# Patient Record
Sex: MALE | Race: BLACK OR AFRICAN AMERICAN | NOT HISPANIC OR LATINO | Employment: FULL TIME
[De-identification: names, ages, dates, MRNs, and addresses within clinical notes are randomized per-mention and may not be internally consistent; named-entity substitution may affect disease eponyms.]

---

## 2017-02-27 ENCOUNTER — RECORDS - HEALTHEAST (OUTPATIENT)
Dept: ADMINISTRATIVE | Facility: OTHER | Age: 47
End: 2017-02-27

## 2017-04-11 ENCOUNTER — RECORDS - HEALTHEAST (OUTPATIENT)
Dept: ADMINISTRATIVE | Facility: OTHER | Age: 47
End: 2017-04-11

## 2017-08-10 ENCOUNTER — OFFICE VISIT - HEALTHEAST (OUTPATIENT)
Dept: FAMILY MEDICINE | Facility: CLINIC | Age: 47
End: 2017-08-10

## 2017-08-10 DIAGNOSIS — R36.9 DISCHARGE FROM PENIS: ICD-10-CM

## 2017-08-10 DIAGNOSIS — R21 RASH OF GROIN: ICD-10-CM

## 2017-08-11 ENCOUNTER — COMMUNICATION - HEALTHEAST (OUTPATIENT)
Dept: FAMILY MEDICINE | Facility: CLINIC | Age: 47
End: 2017-08-11

## 2018-01-30 ENCOUNTER — COMMUNICATION - HEALTHEAST (OUTPATIENT)
Dept: SCHEDULING | Facility: CLINIC | Age: 48
End: 2018-01-30

## 2018-01-31 ENCOUNTER — OFFICE VISIT - HEALTHEAST (OUTPATIENT)
Dept: FAMILY MEDICINE | Facility: CLINIC | Age: 48
End: 2018-01-31

## 2018-01-31 DIAGNOSIS — J10.1 INFLUENZA A: ICD-10-CM

## 2018-01-31 LAB
FLUAV AG SPEC QL IA: ABNORMAL
FLUBV AG SPEC QL IA: ABNORMAL

## 2018-04-09 ENCOUNTER — OFFICE VISIT - HEALTHEAST (OUTPATIENT)
Dept: FAMILY MEDICINE | Facility: CLINIC | Age: 48
End: 2018-04-09

## 2018-04-09 DIAGNOSIS — R05.9 COUGH: ICD-10-CM

## 2018-06-14 ENCOUNTER — COMMUNICATION - HEALTHEAST (OUTPATIENT)
Dept: SCHEDULING | Facility: CLINIC | Age: 48
End: 2018-06-14

## 2018-06-15 ENCOUNTER — OFFICE VISIT - HEALTHEAST (OUTPATIENT)
Dept: FAMILY MEDICINE | Facility: CLINIC | Age: 48
End: 2018-06-15

## 2018-06-15 ENCOUNTER — COMMUNICATION - HEALTHEAST (OUTPATIENT)
Dept: FAMILY MEDICINE | Facility: CLINIC | Age: 48
End: 2018-06-15

## 2018-06-15 DIAGNOSIS — R07.89 ATYPICAL CHEST PAIN: ICD-10-CM

## 2018-06-15 LAB
ALBUMIN SERPL-MCNC: 4.2 G/DL (ref 3.5–5)
ALP SERPL-CCNC: 55 U/L (ref 45–120)
ALT SERPL W P-5'-P-CCNC: 19 U/L (ref 0–45)
ANION GAP SERPL CALCULATED.3IONS-SCNC: 8 MMOL/L (ref 5–18)
AST SERPL W P-5'-P-CCNC: 18 U/L (ref 0–40)
BILIRUB SERPL-MCNC: 0.9 MG/DL (ref 0–1)
BUN SERPL-MCNC: 10 MG/DL (ref 8–22)
CALCIUM SERPL-MCNC: 9.6 MG/DL (ref 8.5–10.5)
CHLORIDE BLD-SCNC: 109 MMOL/L (ref 98–107)
CHOLEST SERPL-MCNC: 168 MG/DL
CO2 SERPL-SCNC: 26 MMOL/L (ref 22–31)
CREAT SERPL-MCNC: 0.95 MG/DL (ref 0.7–1.3)
FASTING STATUS PATIENT QL REPORTED: YES
GFR SERPL CREATININE-BSD FRML MDRD: >60 ML/MIN/1.73M2
GLUCOSE BLD-MCNC: 104 MG/DL (ref 70–125)
HDLC SERPL-MCNC: 39 MG/DL
LDLC SERPL CALC-MCNC: 113 MG/DL
POTASSIUM BLD-SCNC: 4.3 MMOL/L (ref 3.5–5)
PROT SERPL-MCNC: 7 G/DL (ref 6–8)
SODIUM SERPL-SCNC: 143 MMOL/L (ref 136–145)
TRIGL SERPL-MCNC: 82 MG/DL
TROPONIN I SERPL-MCNC: <0.01 NG/ML (ref 0–0.29)
TSH SERPL DL<=0.005 MIU/L-ACNC: 1.04 UIU/ML (ref 0.3–5)

## 2018-06-15 ASSESSMENT — MIFFLIN-ST. JEOR: SCORE: 1859.83

## 2018-06-18 ENCOUNTER — COMMUNICATION - HEALTHEAST (OUTPATIENT)
Dept: FAMILY MEDICINE | Facility: CLINIC | Age: 48
End: 2018-06-18

## 2018-10-08 ENCOUNTER — AMBULATORY - HEALTHEAST (OUTPATIENT)
Dept: FAMILY MEDICINE | Facility: CLINIC | Age: 48
End: 2018-10-08

## 2018-10-08 ENCOUNTER — COMMUNICATION - HEALTHEAST (OUTPATIENT)
Dept: FAMILY MEDICINE | Facility: CLINIC | Age: 48
End: 2018-10-08

## 2018-10-08 RX ORDER — AMMONIUM LACTATE 12 G/100G
LOTION TOPICAL
Refills: 8 | Status: SHIPPED | COMMUNITY
Start: 2018-09-21 | End: 2024-02-14

## 2018-10-10 ENCOUNTER — OFFICE VISIT - HEALTHEAST (OUTPATIENT)
Dept: FAMILY MEDICINE | Facility: CLINIC | Age: 48
End: 2018-10-10

## 2018-10-10 DIAGNOSIS — R22.40: ICD-10-CM

## 2018-10-10 DIAGNOSIS — R07.2 RETROSTERNAL CHEST PAIN: ICD-10-CM

## 2018-10-10 LAB
ATRIAL RATE - MUSE: 70 BPM
DIASTOLIC BLOOD PRESSURE - MUSE: NORMAL MMHG
INTERPRETATION ECG - MUSE: NORMAL
P AXIS - MUSE: 42 DEGREES
PR INTERVAL - MUSE: 194 MS
QRS DURATION - MUSE: 78 MS
QT - MUSE: 384 MS
QTC - MUSE: 414 MS
R AXIS - MUSE: 1 DEGREES
SYSTOLIC BLOOD PRESSURE - MUSE: NORMAL MMHG
T AXIS - MUSE: 40 DEGREES
VENTRICULAR RATE- MUSE: 70 BPM

## 2018-10-16 ENCOUNTER — COMMUNICATION - HEALTHEAST (OUTPATIENT)
Dept: FAMILY MEDICINE | Facility: CLINIC | Age: 48
End: 2018-10-16

## 2018-10-17 ENCOUNTER — COMMUNICATION - HEALTHEAST (OUTPATIENT)
Dept: SCHEDULING | Facility: CLINIC | Age: 48
End: 2018-10-17

## 2018-10-17 ENCOUNTER — COMMUNICATION - HEALTHEAST (OUTPATIENT)
Dept: FAMILY MEDICINE | Facility: CLINIC | Age: 48
End: 2018-10-17

## 2018-10-18 ENCOUNTER — HOSPITAL ENCOUNTER (OUTPATIENT)
Dept: CARDIOLOGY | Facility: HOSPITAL | Age: 48
Discharge: HOME | End: 2018-10-18

## 2018-10-18 DIAGNOSIS — R07.2 RETROSTERNAL CHEST PAIN: ICD-10-CM

## 2018-10-18 LAB
CV STRESS CURRENT BP HE: NORMAL
CV STRESS CURRENT HR HE: 100
CV STRESS CURRENT HR HE: 105
CV STRESS CURRENT HR HE: 107
CV STRESS CURRENT HR HE: 109
CV STRESS CURRENT HR HE: 113
CV STRESS CURRENT HR HE: 114
CV STRESS CURRENT HR HE: 120
CV STRESS CURRENT HR HE: 137
CV STRESS CURRENT HR HE: 142
CV STRESS CURRENT HR HE: 146
CV STRESS CURRENT HR HE: 149
CV STRESS CURRENT HR HE: 159
CV STRESS CURRENT HR HE: 163
CV STRESS CURRENT HR HE: 163
CV STRESS CURRENT HR HE: 164
CV STRESS CURRENT HR HE: 164
CV STRESS CURRENT HR HE: 54
CV STRESS CURRENT HR HE: 59
CV STRESS CURRENT HR HE: 75
CV STRESS CURRENT HR HE: 78
CV STRESS CURRENT HR HE: 80
CV STRESS CURRENT HR HE: 81
CV STRESS CURRENT HR HE: 81
CV STRESS CURRENT HR HE: 84
CV STRESS CURRENT HR HE: 86
CV STRESS CURRENT HR HE: 89
CV STRESS CURRENT HR HE: 91
CV STRESS CURRENT HR HE: 91
CV STRESS DEVIATION TIME HE: NORMAL
CV STRESS ECHO PERCENT HR HE: NORMAL
CV STRESS EXERCISE STAGE HE: NORMAL
CV STRESS FINAL RESTING BP HE: NORMAL
CV STRESS FINAL RESTING HR HE: 80
CV STRESS MAX HR HE: 166
CV STRESS MAX TREADMILL GRADE HE: 18
CV STRESS MAX TREADMILL SPEED HE: 5
CV STRESS PEAK DIA BP HE: NORMAL
CV STRESS PEAK SYS BP HE: NORMAL
CV STRESS PHASE HE: NORMAL
CV STRESS PROTOCOL HE: NORMAL
CV STRESS RESTING PT POSITION HE: NORMAL
CV STRESS RESTING PT POSITION HE: NORMAL
CV STRESS ST DEVIATION AMOUNT HE: NORMAL
CV STRESS ST DEVIATION ELEVATION HE: NORMAL
CV STRESS ST EVELATION AMOUNT HE: NORMAL
CV STRESS TEST TYPE HE: NORMAL
CV STRESS TOTAL STAGE TIME MIN 1 HE: NORMAL
ECHO EJECTION FRACTION ESTIMATED: 65 %
STRESS ECHO BASELINE BP: NORMAL
STRESS ECHO BASELINE HR: 57
STRESS ECHO CALCULATED PERCENT HR: 97 %
STRESS ECHO LAST STRESS BP: NORMAL
STRESS ECHO LAST STRESS HR: 163
STRESS ECHO POST ESTIMATED WORKLOAD: 12.3
STRESS ECHO POST EXERCISE DUR MIN: 12
STRESS ECHO POST EXERCISE DUR SEC: 0
STRESS ECHO TARGET HR: 145

## 2018-10-19 ENCOUNTER — COMMUNICATION - HEALTHEAST (OUTPATIENT)
Dept: FAMILY MEDICINE | Facility: CLINIC | Age: 48
End: 2018-10-19

## 2018-11-30 ENCOUNTER — COMMUNICATION - HEALTHEAST (OUTPATIENT)
Dept: SCHEDULING | Facility: CLINIC | Age: 48
End: 2018-11-30

## 2018-12-06 ENCOUNTER — OFFICE VISIT - HEALTHEAST (OUTPATIENT)
Dept: FAMILY MEDICINE | Facility: CLINIC | Age: 48
End: 2018-12-06

## 2018-12-06 DIAGNOSIS — R42 LIGHTHEADEDNESS: ICD-10-CM

## 2018-12-06 DIAGNOSIS — G44.209 TENSION HEADACHE: ICD-10-CM

## 2018-12-06 LAB
ANION GAP SERPL CALCULATED.3IONS-SCNC: 12 MMOL/L (ref 5–18)
BASOPHILS # BLD AUTO: 0.1 THOU/UL (ref 0–0.2)
BASOPHILS NFR BLD AUTO: 1 % (ref 0–2)
BUN SERPL-MCNC: 9 MG/DL (ref 8–22)
CHLORIDE BLD-SCNC: 103 MMOL/L (ref 98–107)
CO2 SERPL-SCNC: 27 MMOL/L (ref 22–31)
CREAT SERPL-MCNC: 0.9 MG/DL (ref 0.8–1.5)
EOSINOPHIL # BLD AUTO: 0.1 THOU/UL (ref 0–0.4)
EOSINOPHIL NFR BLD AUTO: 2 % (ref 0–6)
ERYTHROCYTE [DISTWIDTH] IN BLOOD BY AUTOMATED COUNT: 10.7 % (ref 11–14.5)
GLUCOSE BLD-MCNC: 87 MG/DL (ref 70–125)
HCT VFR BLD AUTO: 44.5 % (ref 40–54)
HGB BLD-MCNC: 15.1 G/DL (ref 14–18)
LYMPHOCYTES # BLD AUTO: 2.7 THOU/UL (ref 0.8–4.4)
LYMPHOCYTES NFR BLD AUTO: 44 % (ref 20–40)
MCH RBC QN AUTO: 32.5 PG (ref 27–34)
MCHC RBC AUTO-ENTMCNC: 34 G/DL (ref 32–36)
MCV RBC AUTO: 96 FL (ref 80–100)
MONOCYTES # BLD AUTO: 0.3 THOU/UL (ref 0–0.9)
MONOCYTES NFR BLD AUTO: 5 % (ref 2–10)
MONOCYTES NFR BLD AUTO: NEGATIVE %
NEUTROPHILS # BLD AUTO: 3 THOU/UL (ref 2–7.7)
NEUTROPHILS NFR BLD AUTO: 48 % (ref 50–70)
PLATELET # BLD AUTO: 186 THOU/UL (ref 140–440)
PMV BLD AUTO: 8.6 FL (ref 7–10)
POTASSIUM BLD-SCNC: 3.9 MMOL/L (ref 3.5–5.5)
RBC # BLD AUTO: 4.65 MILL/UL (ref 4.4–6.2)
SODIUM SERPL-SCNC: 142 MMOL/L (ref 136–145)
WBC: 6.2 THOU/UL (ref 4–11)

## 2018-12-06 RX ORDER — ACETAMINOPHEN 500 MG
1000 TABLET ORAL EVERY 6 HOURS PRN
Qty: 60 TABLET | Refills: 0 | Status: SHIPPED | OUTPATIENT
Start: 2018-12-06 | End: 2024-02-14

## 2018-12-07 LAB
ATRIAL RATE - MUSE: 49 BPM
DIASTOLIC BLOOD PRESSURE - MUSE: NORMAL MMHG
INTERPRETATION ECG - MUSE: NORMAL
P AXIS - MUSE: 20 DEGREES
PR INTERVAL - MUSE: 198 MS
QRS DURATION - MUSE: 80 MS
QT - MUSE: 406 MS
QTC - MUSE: 366 MS
R AXIS - MUSE: 3 DEGREES
SYSTOLIC BLOOD PRESSURE - MUSE: NORMAL MMHG
T AXIS - MUSE: 23 DEGREES
VENTRICULAR RATE- MUSE: 49 BPM

## 2019-05-17 ENCOUNTER — COMMUNICATION - HEALTHEAST (OUTPATIENT)
Dept: SCHEDULING | Facility: CLINIC | Age: 49
End: 2019-05-17

## 2019-05-22 ENCOUNTER — OFFICE VISIT - HEALTHEAST (OUTPATIENT)
Dept: FAMILY MEDICINE | Facility: CLINIC | Age: 49
End: 2019-05-22

## 2019-05-22 DIAGNOSIS — R07.89 ANTERIOR CHEST WALL PAIN: ICD-10-CM

## 2019-05-22 DIAGNOSIS — R25.2 HAND CRAMP: ICD-10-CM

## 2019-05-22 DIAGNOSIS — R10.9 RIGHT FLANK PAIN: ICD-10-CM

## 2019-05-22 LAB
ALBUMIN UR-MCNC: NEGATIVE MG/DL
ANION GAP SERPL CALCULATED.3IONS-SCNC: 10 MMOL/L (ref 5–18)
APPEARANCE UR: CLEAR
BILIRUB UR QL STRIP: NEGATIVE
BUN SERPL-MCNC: 13 MG/DL (ref 8–22)
CALCIUM SERPL-MCNC: 9.8 MG/DL (ref 8.5–10.5)
CHLORIDE BLD-SCNC: 109 MMOL/L (ref 98–107)
CO2 SERPL-SCNC: 23 MMOL/L (ref 22–31)
COLOR UR AUTO: YELLOW
CREAT SERPL-MCNC: 0.8 MG/DL (ref 0.7–1.3)
GFR SERPL CREATININE-BSD FRML MDRD: >60 ML/MIN/1.73M2
GLUCOSE BLD-MCNC: 86 MG/DL (ref 70–125)
GLUCOSE UR STRIP-MCNC: NEGATIVE MG/DL
HGB UR QL STRIP: NEGATIVE
KETONES UR STRIP-MCNC: NEGATIVE MG/DL
LEUKOCYTE ESTERASE UR QL STRIP: NEGATIVE
MAGNESIUM SERPL-MCNC: 2 MG/DL (ref 1.8–2.6)
NITRATE UR QL: NEGATIVE
PH UR STRIP: 5.5 [PH] (ref 5–8)
POTASSIUM BLD-SCNC: 4.1 MMOL/L (ref 3.5–5)
SODIUM SERPL-SCNC: 142 MMOL/L (ref 136–145)
SP GR UR STRIP: >=1.03 (ref 1–1.03)
UROBILINOGEN UR STRIP-ACNC: NORMAL
VIT B12 SERPL-MCNC: 259 PG/ML (ref 213–816)

## 2019-05-23 ENCOUNTER — COMMUNICATION - HEALTHEAST (OUTPATIENT)
Dept: FAMILY MEDICINE | Facility: CLINIC | Age: 49
End: 2019-05-23

## 2019-05-29 ENCOUNTER — COMMUNICATION - HEALTHEAST (OUTPATIENT)
Dept: FAMILY MEDICINE | Facility: CLINIC | Age: 49
End: 2019-05-29

## 2019-05-30 ENCOUNTER — COMMUNICATION - HEALTHEAST (OUTPATIENT)
Dept: FAMILY MEDICINE | Facility: CLINIC | Age: 49
End: 2019-05-30

## 2019-06-21 ENCOUNTER — COMMUNICATION - HEALTHEAST (OUTPATIENT)
Dept: FAMILY MEDICINE | Facility: CLINIC | Age: 49
End: 2019-06-21

## 2019-06-21 DIAGNOSIS — R07.89 ANTERIOR CHEST WALL PAIN: ICD-10-CM

## 2019-06-28 RX ORDER — IBUPROFEN 800 MG/1
800 TABLET, FILM COATED ORAL EVERY 8 HOURS PRN
Qty: 60 TABLET | Refills: 2 | Status: SHIPPED | OUTPATIENT
Start: 2019-06-28 | End: 2023-05-15

## 2019-12-08 ENCOUNTER — OFFICE VISIT - HEALTHEAST (OUTPATIENT)
Dept: FAMILY MEDICINE | Facility: CLINIC | Age: 49
End: 2019-12-08

## 2019-12-08 DIAGNOSIS — R07.89 ATYPICAL CHEST PAIN: ICD-10-CM

## 2019-12-08 DIAGNOSIS — R05.9 COUGH: ICD-10-CM

## 2019-12-08 LAB
ANION GAP SERPL CALCULATED.3IONS-SCNC: 9 MMOL/L (ref 5–18)
ATRIAL RATE - MUSE: 56 BPM
BUN SERPL-MCNC: 12 MG/DL (ref 8–22)
CHLORIDE BLD-SCNC: 109 MMOL/L (ref 98–107)
CO2 SERPL-SCNC: 24 MMOL/L (ref 22–31)
CREAT SERPL-MCNC: 0.9 MG/DL (ref 0.8–1.5)
DIASTOLIC BLOOD PRESSURE - MUSE: NORMAL
ERYTHROCYTE [DISTWIDTH] IN BLOOD BY AUTOMATED COUNT: 10.4 % (ref 11–14.5)
GLUCOSE BLD-MCNC: 115 MG/DL (ref 70–125)
HCT VFR BLD AUTO: 44.4 % (ref 40–54)
HGB BLD-MCNC: 14.8 G/DL (ref 14–18)
INTERPRETATION ECG - MUSE: NORMAL
MCH RBC QN AUTO: 32.5 PG (ref 27–34)
MCHC RBC AUTO-ENTMCNC: 33.4 G/DL (ref 32–36)
MCV RBC AUTO: 97 FL (ref 80–100)
P AXIS - MUSE: 40 DEGREES
PLATELET # BLD AUTO: 168 THOU/UL (ref 140–440)
PMV BLD AUTO: 7.9 FL (ref 7–10)
POTASSIUM BLD-SCNC: 4.3 MMOL/L (ref 3.5–5.5)
PR INTERVAL - MUSE: 208 MS
QRS DURATION - MUSE: 80 MS
QT - MUSE: 406 MS
QTC - MUSE: 391 MS
R AXIS - MUSE: 3 DEGREES
RBC # BLD AUTO: 4.56 MILL/UL (ref 4.4–6.2)
SODIUM SERPL-SCNC: 142 MMOL/L (ref 136–145)
SYSTOLIC BLOOD PRESSURE - MUSE: NORMAL
T AXIS - MUSE: 28 DEGREES
TROPONIN I SERPL-MCNC: <0.01 NG/ML (ref 0–0.29)
VENTRICULAR RATE- MUSE: 56 BPM
WBC: 5 THOU/UL (ref 4–11)

## 2019-12-08 RX ORDER — BENZONATATE 200 MG/1
200 CAPSULE ORAL 3 TIMES DAILY PRN
Qty: 30 CAPSULE | Refills: 0 | Status: SHIPPED | OUTPATIENT
Start: 2019-12-08 | End: 2021-09-27

## 2019-12-17 ENCOUNTER — OFFICE VISIT - HEALTHEAST (OUTPATIENT)
Dept: FAMILY MEDICINE | Facility: CLINIC | Age: 49
End: 2019-12-17

## 2019-12-17 ENCOUNTER — COMMUNICATION - HEALTHEAST (OUTPATIENT)
Dept: FAMILY MEDICINE | Facility: CLINIC | Age: 49
End: 2019-12-17

## 2019-12-17 DIAGNOSIS — R05.9 COUGH: ICD-10-CM

## 2019-12-26 ENCOUNTER — COMMUNICATION - HEALTHEAST (OUTPATIENT)
Dept: FAMILY MEDICINE | Facility: CLINIC | Age: 49
End: 2019-12-26

## 2019-12-26 DIAGNOSIS — R05.9 COUGH: ICD-10-CM

## 2019-12-28 ENCOUNTER — COMMUNICATION - HEALTHEAST (OUTPATIENT)
Dept: SCHEDULING | Facility: CLINIC | Age: 49
End: 2019-12-28

## 2019-12-28 DIAGNOSIS — R05.9 COUGH: ICD-10-CM

## 2019-12-30 RX ORDER — CODEINE PHOSPHATE AND GUAIFENESIN 10; 100 MG/5ML; MG/5ML
10 SOLUTION ORAL 4 TIMES DAILY PRN
Qty: 240 ML | Refills: 0 | Status: SHIPPED | OUTPATIENT
Start: 2019-12-30 | End: 2021-09-27

## 2021-01-11 ENCOUNTER — OFFICE VISIT - HEALTHEAST (OUTPATIENT)
Dept: FAMILY MEDICINE | Facility: CLINIC | Age: 51
End: 2021-01-11

## 2021-01-11 DIAGNOSIS — K57.30 DIVERTICULOSIS OF LARGE INTESTINE WITHOUT HEMORRHAGE: ICD-10-CM

## 2021-01-11 DIAGNOSIS — K64.9 HEMORRHOIDS, UNSPECIFIED HEMORRHOID TYPE: ICD-10-CM

## 2021-01-11 DIAGNOSIS — R10.32 ABDOMINAL PAIN, LEFT LOWER QUADRANT: ICD-10-CM

## 2021-01-11 LAB
ALBUMIN SERPL-MCNC: 4.5 G/DL (ref 3.5–5)
ALBUMIN UR-MCNC: NEGATIVE MG/DL
ALP SERPL-CCNC: 71 U/L (ref 45–120)
ALT SERPL W P-5'-P-CCNC: 27 U/L (ref 0–45)
ANION GAP SERPL CALCULATED.3IONS-SCNC: 9 MMOL/L (ref 5–18)
APPEARANCE UR: CLEAR
AST SERPL W P-5'-P-CCNC: 22 U/L (ref 0–40)
BACTERIA #/AREA URNS HPF: NORMAL HPF
BASOPHILS # BLD AUTO: 0.1 THOU/UL (ref 0–0.2)
BASOPHILS NFR BLD AUTO: 1 % (ref 0–2)
BILIRUB SERPL-MCNC: 0.6 MG/DL (ref 0–1)
BILIRUB UR QL STRIP: NEGATIVE
BUN SERPL-MCNC: 10 MG/DL (ref 8–22)
CALCIUM SERPL-MCNC: 9.6 MG/DL (ref 8.5–10.5)
CHLORIDE BLD-SCNC: 106 MMOL/L (ref 98–107)
CO2 SERPL-SCNC: 26 MMOL/L (ref 22–31)
COLOR UR AUTO: YELLOW
CREAT SERPL-MCNC: 0.92 MG/DL (ref 0.7–1.3)
EOSINOPHIL # BLD AUTO: 0.3 THOU/UL (ref 0–0.4)
EOSINOPHIL NFR BLD AUTO: 4 % (ref 0–6)
ERYTHROCYTE [DISTWIDTH] IN BLOOD BY AUTOMATED COUNT: 11.9 % (ref 11–14.5)
GFR SERPL CREATININE-BSD FRML MDRD: >60 ML/MIN/1.73M2
GLUCOSE BLD-MCNC: 95 MG/DL (ref 70–125)
GLUCOSE UR STRIP-MCNC: NEGATIVE MG/DL
HCT VFR BLD AUTO: 42.7 % (ref 40–54)
HGB BLD-MCNC: 14.8 G/DL (ref 14–18)
HGB UR QL STRIP: NEGATIVE
IMM GRANULOCYTES # BLD: 0 THOU/UL
IMM GRANULOCYTES NFR BLD: 0 %
KETONES UR STRIP-MCNC: NEGATIVE MG/DL
LEUKOCYTE ESTERASE UR QL STRIP: NEGATIVE
LYMPHOCYTES # BLD AUTO: 2.7 THOU/UL (ref 0.8–4.4)
LYMPHOCYTES NFR BLD AUTO: 39 % (ref 20–40)
MCH RBC QN AUTO: 32.2 PG (ref 27–34)
MCHC RBC AUTO-ENTMCNC: 34.7 G/DL (ref 32–36)
MCV RBC AUTO: 93 FL (ref 80–100)
MONOCYTES # BLD AUTO: 0.6 THOU/UL (ref 0–0.9)
MONOCYTES NFR BLD AUTO: 8 % (ref 2–10)
NEUTROPHILS # BLD AUTO: 3.4 THOU/UL (ref 2–7.7)
NEUTROPHILS NFR BLD AUTO: 49 % (ref 50–70)
NITRATE UR QL: NEGATIVE
PH UR STRIP: 5.5 [PH] (ref 5–8)
PLATELET # BLD AUTO: 217 THOU/UL (ref 140–440)
PMV BLD AUTO: 12 FL (ref 8.5–12.5)
POTASSIUM BLD-SCNC: 4.2 MMOL/L (ref 3.5–5)
PROT SERPL-MCNC: 7.7 G/DL (ref 6–8)
RBC # BLD AUTO: 4.6 MILL/UL (ref 4.4–6.2)
RBC #/AREA URNS AUTO: NORMAL HPF
SODIUM SERPL-SCNC: 141 MMOL/L (ref 136–145)
SP GR UR STRIP: >=1.03 (ref 1–1.03)
SQUAMOUS #/AREA URNS AUTO: NORMAL LPF
UROBILINOGEN UR STRIP-ACNC: NORMAL
WBC #/AREA URNS AUTO: NORMAL HPF
WBC: 7 THOU/UL (ref 4–11)

## 2021-01-11 RX ORDER — HYDROCORTISONE 2.5 %
CREAM (GRAM) TOPICAL 3 TIMES DAILY
Qty: 30 G | Refills: 0 | Status: SHIPPED | OUTPATIENT
Start: 2021-01-11

## 2021-01-12 ENCOUNTER — COMMUNICATION - HEALTHEAST (OUTPATIENT)
Dept: SCHEDULING | Facility: CLINIC | Age: 51
End: 2021-01-12

## 2021-01-15 ENCOUNTER — HEALTH MAINTENANCE LETTER (OUTPATIENT)
Age: 51
End: 2021-01-15

## 2021-05-28 NOTE — TELEPHONE ENCOUNTER
"Caller is pt's wife (Marianela).  States \"He texted me from work that he's having chest pain and wants an appointment.\"  Explained the importance of assessing pt's symptoms directly before scheduling any clinic appt.  Discussed possible need for ED services.  Wife states \"Okay, I'll contact him to call you back directly.\"  (Wife, Marianela, is also not indicated for consent to communicate.)  "

## 2021-05-29 NOTE — PROGRESS NOTES
Assessment/Plan:        1. Anterior chest wall pain  Consider musculoskeletal.   Recent cardiac work up and negative/ normal stress test reviewed.   Plan:   Supportive care offered.   - diclofenac sodium (VOLTAREN) 1 % Gel; Apply 1-2 gram to the affected area two times a day as needed  Dispense: 100 g; Refill: 0    2. Right flank pain  Intermittent and as detailed in the HPI  Plan:   - Urinalysis-UC if Indicated  - CT Abdomen Pelvis Without Oral Without IV Contrast  No renal or ureteral calcificaitons are seen, no hydronephrosis.     There is a tiny mario of calcium visible along the wall of the left side of the  bladder below the insertion on the left ureter. This could be a recently passed stone in the bladder.   No other abnormalities.     Plan:   Keep hydrated.     3. Hand cramp  Consider carpal tunnel vs metabolic derangement.     Plan:   - Basic Metabolic Panel; Future  - Magnesium  - Vitamin B12  - Basic Metabolic Panel  Consider EMG study to rule out carpal tunnel or other neuropathies causing the hand cramping.      At the conclusion of the encounter the plan of care, disposition and all questions were answered and reviewed, and the patient acknowledged understanding and was involved in the decision making regarding the overall care plan.       Will follow up with the knee pain ( work comp)     50  minutes spent on this visit with more than 50% time spent  reviewing medical record, education, providing supportive therapy, commenting and noting the lab and CT findings,  entering orders and preparing documentation for the visit         Subjective:    Patient ID:   Demario Gordon is a 48 y.o. male comes in follow up of :        Chest Pain  Felt to be on the chest wall, and the same as the pain having on 6/15/18 and 10/10/18, with the work up including a stress test negative for cardiac etiology. The pain is intermittent, and can be localized          Right Knee Pain     On going for a while since an injury due  to fall. 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 He has told his employer about it, and may consider opening a work comp case for it.   The pain has been changing in severity, and worse with walking.    He's had no additional work up or imaging done about it.         Right Sided pain on and off for the past 2 months, and seem to be more intensified as of last week.   He's taken tylenol for it.  He states that the pain came on a month ago and lasted on and off for about 3 days.        Intermittent cramping of the hands for the past few months, mostly on the left and at night. During the day is said to be fine. No prior injury.   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            Review of Systems  Allergy: reviewed  General : negative  A complete 7 point review of systems was obtained and is negative other than what is stated in the HPI.        The following patient's history were reviewed and updated as appropriate:   He  has no past medical history on file..      Outpatient Encounter Medications as of 5/22/2019   Medication Sig Dispense Refill     acetaminophen (TYLENOL) 500 MG tablet Take 2 tablets (1,000 mg total) by mouth every 6 (six) hours as needed for pain or fever. 60 tablet 0     ibuprofen (ADVIL,MOTRIN) 600 MG tablet Take 1 tablet (600 mg total) by mouth every 6 (six) hours as needed for pain. 30 tablet 0     omega-3/dha/epa/fish oil (FISH OIL-OMEGA-3 FATTY ACIDS) 300-1,000 mg capsule Take 2 g by mouth daily.       ammonium lactate (LAC-HYDRIN) 12 % lotion   8     diclofenac sodium (VOLTAREN) 1 % Gel Apply 1-2 gram to the affected area two times a day as needed 100 g 0     No facility-administered encounter medications on file as of 5/22/2019.          Objective:   /64 (Patient Site: Right Arm, Patient Position: Sitting, Cuff Size: Adult Large)   Pulse 69   Wt 204 lb 1.6 oz (92.6 kg)   SpO2 96%   BMI 29.71 kg/m        Physical Exam  General  Appearance:    Alert, well hydrated, no distress,    Eyes:    PERRL, conjunctiva/corneas clear,    Throat:   Lips, mucosa, and tongue normal; teeth and gums normal   Neck:   Supple, symmetrical, trachea midline, no adenopathy;        thyroid:  No enlargement/tenderness/nodules; no carotid    bruit or JVD   Lungs:     Clear to auscultation bilaterally, respirations unlabored   Heart:    Regular rate and rhythm, S1 and S2 normal, no murmur, rub   or gallop, normal chest wall and Non tender to palpation.    Abdomen:     Soft, non-tender, normal bowel sounds, no rebound or guarding, no masses, no organomegaly   Extremities:   Extremities normal, atraumatic, no cyanosis or edema   Skin:   Skin color, texture, turgor normal, no rashes or lesions

## 2021-05-29 NOTE — TELEPHONE ENCOUNTER
----- Message from Aureliano Brandon MD sent at 5/23/2019  8:27 AM CDT -----        Normal labs  Awaiting the CT scan     Consider EMG study to rule out carpal tunnel or other neuropathies causing the hand cramping. Will place order if agree

## 2021-05-29 NOTE — TELEPHONE ENCOUNTER
Central PA team  [phone]  Pool: [facility] PA MED ([id number])          PA has been initiated.       PA form completed and faxed insurance via Cover My Meds     Key:  [id number]     Medication:  Diclofenac Sodium 1% gel    Insurance:  [employer] [place]        Response will be received via fax and may take up to 5-10 business days depending on plan

## 2021-05-29 NOTE — TELEPHONE ENCOUNTER
Fax received from [facility] pharmacy requesting Prior Authorization    Medication Name:   diclofenac sodium (VOLTAREN) 1 % Gel 100 g 0 5/22/2019     Sig: Apply 1-2 gram to the affected area two times a day as needed    Sent to pharmacy as: diclofenac sodium (VOLTAREN) 1 % Gel    E-Prescribing Status: Receipt confirmed by pharmacy (5/22/2019  9:48 AM CDT)          Insurance Plan: [facility]   PBM:    Patient ID Number: [id number]    Please start PA process

## 2021-05-29 NOTE — TELEPHONE ENCOUNTER
Please call patient and advise:  Calcium stones are caused by a variety of conditions, but proper dieting can help reduce the recurrence.  We will suggest to keep hydrated and avoid soft drinks especially sejal that contain phosphoric acid therefore predisposing to stone formation.  Also want to check vitamin D, calcium and other hormone levels at some point.

## 2021-05-30 NOTE — TELEPHONE ENCOUNTER
Patient Returning Call  Reason for call:  Patient returning call   Information relayed to patient:  Yes as instructed and patient agrees with plan.  Patient has additional questions:  Yes  If YES, what are your questions/concerns:  Can you please send the Ibuprofen 800 mg to the [facility] on [address] in [place]   Okay to leave a detailed message?: Yes

## 2021-05-30 NOTE — TELEPHONE ENCOUNTER
Please call the patient and advised that the Voltaren gel is not covered by his plan and I would recommend him to continue on ibuprofen up to 800 mg 3 times a day taken with food as an alternative as needed

## 2021-05-31 VITALS — WEIGHT: 210.8 LBS

## 2021-06-01 VITALS — WEIGHT: 223 LBS | HEIGHT: 70 IN | BODY MASS INDEX: 31.92 KG/M2

## 2021-06-01 VITALS — WEIGHT: 230 LBS

## 2021-06-02 VITALS — WEIGHT: 208.7 LBS | BODY MASS INDEX: 30.38 KG/M2

## 2021-06-02 VITALS — WEIGHT: 202 LBS | BODY MASS INDEX: 29.4 KG/M2

## 2021-06-03 VITALS — WEIGHT: 204.1 LBS | BODY MASS INDEX: 29.71 KG/M2

## 2021-06-04 VITALS
WEIGHT: 206 LBS | SYSTOLIC BLOOD PRESSURE: 148 MMHG | BODY MASS INDEX: 29.98 KG/M2 | TEMPERATURE: 98.3 F | HEART RATE: 76 BPM | DIASTOLIC BLOOD PRESSURE: 80 MMHG | RESPIRATION RATE: 14 BRPM | OXYGEN SATURATION: 99 %

## 2021-06-04 VITALS
BODY MASS INDEX: 30.26 KG/M2 | WEIGHT: 207.9 LBS | HEART RATE: 78 BPM | TEMPERATURE: 98.1 F | DIASTOLIC BLOOD PRESSURE: 70 MMHG | OXYGEN SATURATION: 98 % | SYSTOLIC BLOOD PRESSURE: 143 MMHG

## 2021-06-04 NOTE — TELEPHONE ENCOUNTER
Controlled Substance Refill Request  Medication:   Requested Prescriptions     Pending Prescriptions Disp Refills     codeine-guaiFENesin (GUAIFENESIN AC)  mg/5 mL liquid 240 mL 0     Sig: Take 10 mL by mouth 4 (four) times a day as needed.     Date Last Fill: 12/17/2019  Pharmacy: [facility], #[id number]  [address], [facility]  Submit electronically to pharmacy  Controlled Substance Agreement on File:   Encounter-Level CSA Scan Date:    There are no encounter-level csa scan date.       Last office visit: 12/17/2019. Last office visit pertaining to requested medication was 12/17/2019 with Dr REDDY Tobin @ [facility] clinic.     Advised patient that we are unable to refill prescriptions of Controlled substances when the clinic is closed, and that message will be forwarded to provider for Monday.     Clementine Downs RN Triage Nurse Advisor

## 2021-06-04 NOTE — PROGRESS NOTES
Chief Complaint   Patient presents with     Cough     cough, dry throat, chest pain stress causes more pain, had heart study done at [facility] cramp in left arm, he felt dizzy, stomach pain         HPI    Patient is here for five days of mostly nonproductive cough, with nasal discharge. No fever, shortness of breath. He also reported a few days of worsening left sided chest pain with left arm cramping, and intermittent dizziness. He has had ongoing left sided chest pain, with negative cardiac studies. He said his chest pain tends to get worse with stress. Lately he has been working 7 days a week and stressful. Yesterday he had some diarrhea, but improved today. Currently the dizziness is only mild.     ROS: Pertinent ROS noted in HPI.     No Known Allergies    Patient Active Problem List   Diagnosis     Internal Hemorrhoids     Myalgia And Myositis     Skin Disorder     Herpes Simplex Type II     External Hemorrhoids     Herpes Simplex Type I     Tinea Cruris       Family History   Problem Relation Age of Onset     Diabetes Mother      Hypertension Mother        Social History     Socioeconomic History     Marital status:      Spouse name: Not on file     Number of children: Not on file     Years of education: Not on file     Highest education level: Not on file   Occupational History     Not on file   Social Needs     Financial resource strain: Not on file     Food insecurity:     Worry: Not on file     Inability: Not on file     Transportation needs:     Medical: Not on file     Non-medical: Not on file   Tobacco Use     Smoking status: Never Smoker     Smokeless tobacco: Never Used   Substance and Sexual Activity     Alcohol use: No     Drug use: No     Sexual activity: Not on file   Lifestyle     Physical activity:     Days per week: Not on file     Minutes per session: Not on file     Stress: Not on file   Relationships     Social connections:     Talks on phone: Not on file     Gets together: Not on file      Attends Denominational service: Not on file     Active member of club or organization: Not on file     Attends meetings of clubs or organizations: Not on file     Relationship status: Not on file     Intimate partner violence:     Fear of current or ex partner: Not on file     Emotionally abused: Not on file     Physically abused: Not on file     Forced sexual activity: Not on file   Other Topics Concern     Not on file   Social History Narrative     Not on file             Objective:    Vitals:    12/08/19 0910 12/08/19 0918   BP: 150/80 148/80   Patient Site: Right Arm Right Arm   Patient Position: Sitting Sitting   Cuff Size: Adult Regular Adult Regular   Pulse: 76    Resp: 14    Temp: 98.3  F (36.8  C)    SpO2: 99%    Weight: 206 lb (93.4 kg)        Gen:NAD  Throat: oropharynx clear, tonsils normal  Ears: TMs clear without effusions, ear canals normal with small cerumen  Nose: No discharge  Neck: No adenopathy  CV: RRR, normal S1S2, no M, R, G  Pulm: CTAB, normal effort  Chest wall: reproducible tenderness to palpation of left chest wall.   Abd: Normal inspection, normal bowel sounds, soft, no pain, no mass/HSM  Skin: dry, warm, no acute lesions    Recent Results (from the past 24 hour(s))   Troponin I   Result Value Ref Range    Troponin I <0.01 0.00 - 0.29 ng/mL   ISTAT CHEM 7 (HE CLINIC ONLY)   Result Value Ref Range    Sodium 142 136 - 145 mmol/L    Potassium 4.3 3.5 - 5.5 mmol/L    CO2 24 22 - 31 mmol/L    Chloride 109 (H) 98 - 107 mmol/L    Anion Gap, Calculation 9 5 - 18 mmol/L    Glucose 115 70 - 125 mg/dL    BUN 12 8 - 22 mg/dL    Creatinine 0.90 0.80 - 1.50 mg/dL   HM2(CBC w/o Differential)   Result Value Ref Range    WBC 5.0 4.0 - 11.0 thou/uL    RBC 4.56 4.40 - 6.20 mill/uL    Hemoglobin 14.8 14.0 - 18.0 g/dL    Hematocrit 44.4 40.0 - 54.0 %    MCV 97 80 - 100 fL    MCH 32.5 27.0 - 34.0 pg    MCHC 33.4 32.0 - 36.0 g/dL    RDW 10.4 (L) 11.0 - 14.5 %    Platelets 168 140 - 440 thou/uL    MPV 7.9 7.0 -  10.0 fL   Electrocardiogram Perform and Read   Result Value Ref Range    SYSTOLIC BLOOD PRESSURE      DIASTOLIC BLOOD PRESSURE      VENTRICULAR RATE 56 BPM    ATRIAL RATE 56 BPM    P-R INTERVAL 208 ms    QRS DURATION 80 ms    Q-T INTERVAL 406 ms    QTC CALCULATION (BEZET) 391 ms    P Axis 40 degrees    R AXIS 3 degrees    T AXIS 28 degrees    MUSE DIAGNOSIS       Sinus bradycardia  Otherwise normal ECG  When compared with ECG of 06-DEC-2018 16:30,  No significant change was found       CXR - no pneumonia nor other acute abnormalities per my interpretation, discussed during visit.     EKG - sinus rhythm without acute abnormalities per my interpretation, pending official read, discussed during visit.         Atypical chest pain - reproducible, suspect musculoskeletal pain. F/u as directed.   -     XR Chest 2 Views  -     Troponin I  -     Electrocardiogram Perform and Read  -     ISTAT CHEM 7 ([facility] CLINIC ONLY)  -     HM2(CBC w/o Differential)    Cough - likely virally mediated.   -     XR Chest 2 Views  -     benzonatate (TESSALON) 200 MG capsule; Take 1 capsule (200 mg total) by mouth 3 (three) times a day as needed for cough.

## 2021-06-04 NOTE — PROGRESS NOTES
ASSESSMENT:  1. Cough  Several week history of a non-productive cough, no other systemic symptoms, strongly suspect viral bronchitis.  Chest x-ray negative.  - XR Chest 2 Views  - codeine-guaiFENesin (GUAIFENESIN AC)  mg/5 mL liquid; Take 10 mL by mouth 4 (four) times a day as needed.  Dispense: 240 mL; Refill: 0        PLAN:  1.  Two-view chest x-ray results discussed with the patient.  2.  Symptomatic treatment with Cheratussin with codeine cough syrup did discuss possibility of drowsiness or sedation.  3.  Patient can continue Tessalon Perles.  4.  I told the patient that the cough will resolve with time, and that antibiotics would not be warranted.    Orders Placed This Encounter   Procedures     XR Chest 2 Views     Order Specific Question:   Can the procedure be changed per Radiologist protocol?     Answer:   Yes     There are no discontinued medications.    Return in about 1 week (around 12/24/2019) for recheck w/ PCP if symptoms persist or worsen.    CHIEF COMPLAINT:  Chief Complaint   Patient presents with     Follow-up     from last week after going to [facility] for cough and dry throat. Pt says he is still coughing at times        SUBJECTIVE:  Demario is a 48 y.o. male presenting to the clinic today with a cough, dry throat and chest pain. Patient states he has had a dry, non-productive cough with intermittent chest pain since 12/3. He was seen at the [facility] walk in clinic on 12/8. A chest xray ruled out pneumonia and an EKG did not show any acute abnormalities.  Going outside in the cold makes his cough worse. His throat hurts when he coughs and the cough keeps him up at night. Patient says he sometimes gets chest pain after coughing. He is taking tessalon perles for the cough and ibuprofen for his chest pain.      REVIEW OF SYSTEMS:   Denies fever  All other systems are negative.    PFSH:  Immunization History   Administered Date(s) Administered     Influenza, Seasonal, Inj PF IIV3 12/01/2010      Social History     Socioeconomic History     Marital status:      Spouse name: Not on file     Number of children: Not on file     Years of education: Not on file     Highest education level: Not on file   Occupational History     Not on file   Social Needs     Financial resource strain: Not on file     Food insecurity:     Worry: Not on file     Inability: Not on file     Transportation needs:     Medical: Not on file     Non-medical: Not on file   Tobacco Use     Smoking status: Never Smoker     Smokeless tobacco: Never Used   Substance and Sexual Activity     Alcohol use: No     Drug use: No     Sexual activity: Not on file   Lifestyle     Physical activity:     Days per week: Not on file     Minutes per session: Not on file     Stress: Not on file   Relationships     Social connections:     Talks on phone: Not on file     Gets together: Not on file     Attends Holiness service: Not on file     Active member of club or organization: Not on file     Attends meetings of clubs or organizations: Not on file     Relationship status: Not on file     Intimate partner violence:     Fear of current or ex partner: Not on file     Emotionally abused: Not on file     Physically abused: Not on file     Forced sexual activity: Not on file   Other Topics Concern     Not on file   Social History Narrative     Not on file     No past medical history on file.  Family History   Problem Relation Age of Onset     Diabetes Mother      Hypertension Mother        MEDICATIONS:  Current Outpatient Medications   Medication Sig Dispense Refill     acetaminophen (TYLENOL) 500 MG tablet Take 2 tablets (1,000 mg total) by mouth every 6 (six) hours as needed for pain or fever. 60 tablet 0     ammonium lactate (LAC-HYDRIN) 12 % lotion   8     benzonatate (TESSALON) 200 MG capsule Take 1 capsule (200 mg total) by mouth 3 (three) times a day as needed for cough. 30 capsule 0     diclofenac sodium (VOLTAREN) 1 % Gel Apply 1-2 gram to  the affected area two times a day as needed 100 g 0     ibuprofen (ADVIL,MOTRIN) 800 MG tablet Take 1 tablet (800 mg total) by mouth every 8 (eight) hours as needed for pain. 60 tablet 2     omega-3/dha/epa/fish oil (FISH OIL-OMEGA-3 FATTY ACIDS) 300-1,000 mg capsule Take 2 g by mouth daily.       codeine-guaiFENesin (GUAIFENESIN AC)  mg/5 mL liquid Take 10 mL by mouth 4 (four) times a day as needed. 240 mL 0     ibuprofen (ADVIL,MOTRIN) 600 MG tablet Take 1 tablet (600 mg total) by mouth every 6 (six) hours as needed for pain. 30 tablet 0     No current facility-administered medications for this visit.        TOBACCO USE:  Social History     Tobacco Use   Smoking Status Never Smoker   Smokeless Tobacco Never Used       VITALS:  Vitals:    12/17/19 0753   BP: 143/70   Pulse: 78   Temp: 98.1  F (36.7  C)   SpO2: 98%   Weight: 207 lb 14.4 oz (94.3 kg)     Wt Readings from Last 3 Encounters:   12/17/19 207 lb 14.4 oz (94.3 kg)   12/08/19 206 lb (93.4 kg)   05/22/19 204 lb 1.6 oz (92.6 kg)       PHYSICAL EXAM:  Constitutional:   Reveals a healthy appearing man.    Vitals: per nursing notes.  Head: Atraumatic, Normocephalic  Nose: No significant mucous noted.  Ears:  External canals, TMs clear.    Eyes:  EOMs full, PERRL.  Lungs: Clear to A&P without rales or wheezes.  Respiratory effort normal.  Cardiac:   Regular rate and rhythm, normal S1, S2, no murmur or gallop.  Neuro:  Alert and oriented. No gross focal deficits.  Psychiatric:  Memory intact, mood appropriate.    DATA REVIEWED:  Additional History from Old Records Summarized (2): None. Reviewed 12/8 visit to the [facility] walk in clinic. He was diagnosed with atypical chest pain, suspected to be musculoskeletal pain, and was diagnosed with a cough. He was given tessalon perles for the cough.  Decision to Obtain Records (1): None.  Radiology Tests Summarized or Ordered (1): chest xray ordered.  Labs Reviewed or Ordered (1): Reviewed labs from 12/8  visit to [facility] in clinic.   Medicine Test Summarized or Ordered (1): None.  Independent Review of EKG, X-RAY, or RAPID STREP (2 each): Preliminary impression negative for pneumonia.    The visit lasted a total of 7 minutes face to face with the patient. Over 50% of the time was spent counseling ad educating the patient about coughs and chest pain.    ICam, am scribing for and in the presence of, Dr. Tobin.    I, Dr. Tobin, personally performed the services described in this documentation, as scribed by Cam Guillen in my presence, and it is both accurate and complete.    Total data points: 6

## 2021-06-04 NOTE — TELEPHONE ENCOUNTER
Refill Request  Did you contact pharmacy: No  Medication name:   codeine-guaiFENesin (GUAIFENESIN AC)  mg/5 mL liquid  Who prescribed the medication: Dr. Harris  Pharmacy Name and Location: [facility] on [address]  Is patient out of medication: Yes  Patient notified refills processed in 72 hours:  yes  Okay to leave a detailed message: yes  Pt states that he is still coughing but the chest pain had gone away for the most part and wondering if he can get one more refill until the cough is gone.   80

## 2021-06-04 NOTE — TELEPHONE ENCOUNTER
ASSESSMENT:  1. Cough  Several week history of a non-productive cough, no other systemic symptoms, strongly suspect viral bronchitis.  Chest x-ray negative.  - XR Chest 2 Views  - codeine-guaiFENesin (GUAIFENESIN AC)  mg/5 mL liquid; Take 10 mL by mouth 4 (four) times a day as needed.  Dispense: 240 mL; Refill: 0           PLAN:  1.  Two-view chest x-ray results discussed with the patient.  2.  Symptomatic treatment with Cheratussin with codeine cough syrup did discuss possibility of drowsiness or sedation.  3.  Patient can continue Tessalon Perles.  4.  I told the patient that the cough will resolve with time, and that antibiotics would not be warranted.       Last OV 12/17/19  Crista Guerra scheduled

## 2021-06-04 NOTE — TELEPHONE ENCOUNTER
Controlled Substance Refill Request  Medication:   Requested Prescriptions     Pending Prescriptions Disp Refills     codeine-guaiFENesin (GUAIFENESIN AC)  mg/5 mL liquid [Pharmacy Med Name: guaiFENesin-Codeine Oral Solution 100-10 MG/5ML] 240 mL 0     Sig: Take 10 mL by mouth 4 (four) times a day as needed.     Date Last Fill: 12/17/19  [facility]: [facility]   Submit electronically to pharmacy  Controlled Substance Agreement on File:   Encounter-Level CSA Scan Date:    There are no encounter-level csa scan date.       Last office visit: Last office visit pertaining to requested medication was 12/17/19.

## 2021-06-05 VITALS
DIASTOLIC BLOOD PRESSURE: 60 MMHG | HEART RATE: 72 BPM | OXYGEN SATURATION: 97 % | SYSTOLIC BLOOD PRESSURE: 132 MMHG | BODY MASS INDEX: 31.21 KG/M2 | WEIGHT: 214.4 LBS

## 2021-06-12 NOTE — PROGRESS NOTES
Chief complaint: Rash on proximal thighs and penile discharge    HPI: The patient reports that he is stable and sexually monogamous with his wife, but he is concerned that he has some burning on his penis and some rash on his proximal thighs and may be a discharge from his penis.  When he and his wife had a conversation about that she denied any extramarital relations.  He is quite concerned that there might be something serious going on and comes in for evaluation.    He has been evaluated by multiple physicians and the dermatologist multiple times and received different topical creams.  He is interested in something to clear this up he is interested in a diagnosis and got worked in urgently into my schedule today.    He thinks that may be 1 of the creams he had in his cabinet he tried once and it did not really help.    He reports that when he had seen a dermatologist, he was given some sort of topical cream or ointment and it did not work so he stopped using it.  He reports that the rash comes and goes.    Objective:/66 (Patient Site: Right Arm, Patient Position: Sitting, Cuff Size: Adult Large)  Pulse 84  Wt 210 lb 12.8 oz (95.6 kg)  BMI 30.68 kg/m2  This  gentleman is in no distress but has a difficult time getting me the entire history.  Ultimately it looks to be some hyperpigmentation of the proximal thighs but he said there was no rash on the scrotum or on the penis just on the proximal thighs it was very itchy and kind of burning.  I do not see any flakes or scales but with the hyperpigmentation I am highly suspicious that this represents tinea cruris.  Because of the possible penile discharge, I will do a urine test for GC and chlamydia and he will be notified of those results.    Assessment: Probable tinea fungal infection in the groin  Penile discharge will check for chlamydia and gonorrhea    Plan: I will prescribe again the Lotrisone cream which she has had in the past for twice a day  for 2 weeks and if things are getting better, he needs to be evaluated again by dermatologist.  If the GC and chlamydia testing is negative, he likely is going to need to have his wife be evaluated for possible yeast or bacterial vaginal overgrowth to see if that accounts for his symptoms.

## 2021-06-14 NOTE — TELEPHONE ENCOUNTER
RN Triage:     Pharmacy calling asking where to apply the prescription hydrocortisone.   Reviewed from clinic note on 1/11/21  Hemorrhoids, unspecified hemorrhoid type  Trial of anusol cream.  If no improvement then recommend referral to [facility].  Discussed worsening symptoms should they arise and what to expect.  - hydrocortisone 2.5 % cream  Dispense: 30 g; Refill: 0    Deepali Alba RN, BSN [facility] Triage Nurse    Reason for Disposition    Pharmacy calling with prescription question and triager answers question    Additional Information    Negative: Caller requesting a refill, no triage required, and triager able to refill per department policy    Negative: Caller has medication question, adult has minor symptoms, caller declines triage, and triager answers question    Negative: Caller has medication question only, adult not sick, and triager answers question    Negative: DOUBLE DOSE (an extra dose or lesser amount) of antibiotic drug and NO symptoms    Negative: DOUBLE DOSE (an extra dose or lesser amount) of over-the-counter (OTC) drug and NO symptoms    Negative: Caller has NON-URGENT medication question about med that PCP prescribed and triager unable to answer question    Negative: Caller requesting a NON-URGENT new prescription or refill and triager unable to refill per department policy    Negative: Request for URGENT new prescription or refill of 'essential' medication (i.e., likelihood of harm to patient if not taken) and triager unable to fill per department policy    Negative: Prescription not at pharmacy and was prescribed today by PCP    Negative: Pharmacy calling with prescription questions and triager unable to answer question    Negative: Caller has URGENT medication question about med that PCP prescribed and triager unable to answer question    Negative: Caller has medication question about med not prescribed by PCP and triager unable to answer question (e.g., compatibility with other med,  storage)    Negative: DOUBLE DOSE (an extra dose or lesser amount) of prescription drug and NO symptoms (Exception: a double dose of antibiotics)    Negative: Diabetes drug error or overdose (e.g., took wrong type of insulin or took extra dose)    Negative: MORE THAN A DOUBLE DOSE of a prescription or over-the-counter (OTC) drug    Negative: DOUBLE DOSE (an extra dose or lesser amount) of over-the-counter (OTC) drug and any symptoms (e.g., dizziness, nausea, pain, sleepiness)    Negative: DOUBLE DOSE (an extra dose or lesser amount) of prescription drug and any symptoms (e.g., dizziness, nausea, pain, sleepiness)    Negative: Took another person's prescription drug    Negative: Drug overdose and triager unable to answer question    Negative: Caller requesting information unrelated to medicine    Negative: Caller requesting a prescription for Strep throat and has a positive culture result    Negative: Rash while taking a medication or within 3 days of stopping it    Negative: Immunization reaction suspected    Negative: Asthma and having symptoms of asthma (cough, wheezing, etc.)    Negative: Breastfeeding questions about mother's medicines and diet    Protocols used: MEDICATION QUESTION CALL-A-[place]

## 2021-06-14 NOTE — PROGRESS NOTES
Dre Gordon,    Your result is/are normal.  Please continue your current treatment plan.  Continue current medications if on any.  Call if any questions or concerns.    Renay Ochoa MD

## 2021-06-15 NOTE — PROGRESS NOTES
Assessment:   1. Influenza A  - Influenza A/B Rapid Test: Positive influenza A  - oseltamivir (TAMIFLU) 75 MG capsule; Take 1 capsule (75 mg total) by mouth 2 (two) times a day for 5 days.  Dispense: 10 capsule; Refill: 0  - ibuprofen (ADVIL,MOTRIN) 600 MG tablet; Take 1 tablet (600 mg total) by mouth every 6 (six) hours as needed for pain.  Dispense: 60 tablet; Refill: 2     Plan:   Supportive care with appropriate antipyretics and fluids.  Obtain labs per orders.  Educational material distributed and questions answered.  Antivirals per orders.  Follow up in 5 days or as needed.     Subjective:   Demario Gordon is a 47 y.o. male who presents for evaluation of influenza like symptoms. Symptoms include chest congestion, chills, dry cough, headache, myalgias, productive cough and fever and have been present for 2 days. He has tried to alleviate the symptoms with acetaminophen and ibuprofen with minimal relief. High risk factors for influenza complications: none.    The following portions of the patient's history were reviewed and updated as appropriate: allergies, current medications, past family history, past medical history, past social history, past surgical history and problem list.    Review of Systems  A 12 point comprehensive review of systems was negative except as noted.       Objective:   /78  Pulse 80  Temp 99.8  F (37.7  C) (Oral)   SpO2 98%  General appearance: alert, appears stated age and cooperative   Head: Normocephalic, without obvious abnormality, atraumatic  Eyes: conjunctivae/corneas clear. PERRL, EOM's intact. Fundi benign.  Ears: normal TM's and external ear canals both ears  Nose: clear discharge, moderate congestion, turbinates inflamed  Throat: lips, mucosa, and tongue normal; teeth and gums normal  Neck: no adenopathy, no carotid bruit, no JVD, supple, symmetrical, trachea midline and thyroid not enlarged, symmetric, no tenderness/mass/nodules  Lungs: clear to auscultation  bilaterally  Heart: regular rate and rhythm, S1, S2 normal, no murmur, click, rub or gallop  Abdomen: soft, non-tender; bowel sounds normal; no masses,  no organomegaly  Pulses: 2+ and symmetric  Skin: Skin color, texture, turgor normal. No rashes or lesions  Lymph nodes: Cervical, supraclavicular, and axillary nodes normal.  Neurologic: Grossly normal

## 2021-06-16 NOTE — TELEPHONE ENCOUNTER
Telephone Encounter by Sapna Rider at 6/24/2019  3:17 PM     Author: Sapna Rider Service: -- Author Type: --    Filed: 6/24/2019  3:19 PM Encounter Date: 6/21/2019 Status: Signed    : Sapna Rider       PRIOR AUTHORIZATION DENIED    Denial Rational: Patient must be using medication for osteoarthritis of one or more of the following joints: Hands, wrists, elbows, feet, ankles or knees.  Patient must also try and fail medications listed below before diclofenac gel will be covered.           Appeal Information: If provider would like to appeal please provide a letter of medical necessity stating why formulary alternatives would not be clinically appropriate for the patient and route back to the PA team.

## 2021-06-17 NOTE — PROGRESS NOTES
Assessment/Plan:     1. Cough  Cough appears benign at this time.  Lungs are clear and patient appears well.  Possible allergies; consider use of an antihistamine such as Claritin or Zyrtec.  May trial some warm compresses for the chest.      Subjective:     Demario Gordon is a 47 y.o. male who presents with complaints of an intermittent cough ×3 weeks.  Cough seems to come and go.  It is not productive.  Denies any shortness of breath or wheezing.  No fever, sore throat, ear pain, or sinus congestion.  Patient does not have a history of reflux, and denies any heartburn symptoms or abdominal pain.  He does endorse intermittent chest pain that comes and goes with the cough.  It will sometimes be painful to the rib cage and sternum with pressure on that area.  Patient is otherwise feeling fine.  He does exercise regularly.      The following portions of the patient's history were reviewed and updated as appropriate: allergies, current medications, past family history, past medical history, past social history, past surgical history and problem list.    Review of Systems  Pertinent items are noted in HPI.     Objective:     /70 (Patient Site: Left Arm, Patient Position: Sitting, Cuff Size: Adult Large)  Pulse 74  Temp 97.9  F (36.6  C) (Oral)   Resp 16  Wt (!) 230 lb (104.3 kg)  SpO2 98%  BMI 33.48 kg/m2    General Appearance: Alert, cooperative, no distress, appears stated age  Ears: Normal TM's and external ear canals, both ears  Throat: Lips, mucosa, and tongue normal; teeth and gums normal  Neck: Supple, symmetrical, trachea midline, no adenopathy  Lungs: Clear to auscultation bilaterally, respirations unlabored. No wheezing. No pain to chest wall with palpation.   Heart: Regular rate and rhythm, S1 and S2 normal, no murmur, rub, or gallop    LADY Montgomery

## 2021-06-18 NOTE — PROGRESS NOTES
Family Medicine Office Visit  [facility]  Patient Name: Demario Gordon  Patient Age: 47 y.o.  YOB: 1970  MRN: [id number]    Date of Visit: 6/15/2018  Reason for Office Visit:   Chief Complaint   Patient presents with     Establish Care     Chest Pain     Ongoing chest pain for 5 months. Off and on. Pain is located in the left and right side of chest. Denies SOB.            Assessment / Plan / Medical Decision Makin. Atypical chest pain  Will do labs and XR and rule out immediate cardiac concerns.  Recommend stress test.  Any worsening symptoms go immediately to the ER.  Sounds to be more musculoskeletal but will do full testing to ensure  - Comprehensive Metabolic Panel  - Troponin I  - Thyroid Cascade  - Lipid Cascade RANDOM  - XR Chest 2 Views        Health Maintenance Review  Health Maintenance   Topic Date Due     TD 18+ HE  1986     TDAP ADULT ONE TIME DOSE  1986     ADVANCE DIRECTIVES DISCUSSED WITH PATIENT  2015     INFLUENZA VACCINE RULE BASED (Season Ended) 2018     COLONOSCOPY  10/29/2023         I am having Mr. Gordon maintain his ibuprofen.      HPI:  Demario Gordon is a 47 y.o. year old who presents to the office today for establish care.  Pt is having some chest pain off and on for the past 5 months.  States that it is usually there at a baseline and then aguilar worsen at times.  Feels like it is worse if pushing on it at times and then other times massaging the area makes it better.  States it initially started on the right and left side together and now mostly left sided.  No radiation, no nausea, no shortness of breath.  6-7/10 at its worst.  Coughing occasionally and that seems to correlate with the pain some but usually coughing worse around change in season time.  Previously doing push ups and thought that might be related to the pain but now stopped and the pain is still there.  Pain not relieved with ibuprofen          Review of Systems- pertinent positive in bold:  Constitutional: Fever, chills, night sweats, fainting, weight change, fatigue, seizures, dizziness, sleeping difficulties, loud snoring/pauses in breathing  Eyes: change in vision, blurred or double vision, redness/eye pain  Ears, nose, mouth, throat: change in hearing, ear pain, hoarseness, difficulty swallowing, sores in the mouth or throat  Respiratory: shortness of breath, cough, bloody sputum, wheezing  Cardiovascular: chest pain, palpitations   Gastrointestinal: abdominal pain, heartburn/indigestion, nausea/vomiting, change in appetite, change in bowel habits, constipation or diarrhea, rectal bleeding/dark stools, difficulty swallowing  Urinary: painful urination, frequent urination, urinary urgency/incontinence, blood in urine/dark urine, nocturia  Musculoskeletal: backache/back pain (new or increasing), weakness, joint pain/stiffness (new or increasing), muscle cramps, swelling of hands, feet, ankles, leg pain/redness  Skin: change in moles/freckles, rash, nodules  Hematologic/lymphatic: swollen lymph glands, abnormal bruising/bleeding  Endocrine: excessive thirst/urination, cold or heat intolerance  Neurologic/emotional: worrisome memory change, numbness/tingling, anxiety, mood swings      Current Scheduled Meds:  Outpatient Encounter Prescriptions as of 6/15/2018   Medication Sig Dispense Refill     ibuprofen (ADVIL,MOTRIN) 600 MG tablet Take 1 tablet (600 mg total) by mouth every 8 (eight) hours as needed for pain. 30 tablet 0     No facility-administered encounter medications on file as of 6/15/2018.      History reviewed. No pertinent past medical history.  Past Surgical History:   Procedure Laterality Date     CARPAL TUNNEL RELEASE Right      Social History   Substance Use Topics     Smoking status: Never Smoker     Smokeless tobacco: Never Used     Alcohol use No       Objective / Physical Examination:  Vitals:    06/15/18 1205   BP: 118/82  "  Pulse: 79   SpO2: 98%   Weight: (!) 223 lb (101.2 kg)   Height: 5' 9.5\" (1.765 m)     Wt Readings from Last 3 Encounters:   06/15/18 (!) 223 lb (101.2 kg)   04/09/18 (!) 230 lb (104.3 kg)   08/10/17 210 lb 12.8 oz (95.6 kg)     BP Readings from Last 3 Encounters:   06/15/18 118/82   04/09/18 122/70   01/31/18 146/78     Body mass index is 32.46 kg/(m^2).     General Appearance: Alert and oriented, cooperative, affect appropriate, speech clear, in no apparent distress  Head: Normocephalic, atraumatic  Ears: Tympanic membrane clear with landmarks well visualized bilaterally  Eyes: PERRL, fundi appear clear bilaterally. EOMI. Conjunctivae clear and sclerae non-icteric  Nose: Septum midline, nares patent, no visible polyps, mucosa moist and without drainage  Throat: Lips and mucosa moist. Teeth in good repair, pharynx without erythema or exudate  Neck: Supple, trachea midline. No cervical adenopathy  Back: Symmetrical and nontender  Lungs: Clear to auscultation bilaterally. Normal inspiratory and expiratory effort  Cardiovascular: Regular rate, normal S1, S2. No murmurs, rubs, or gallops  Abdomen: Bowel sounds active all four quadrants. Soft, non-tender. No hepatomegaly or splenomegaly. No bruits detected.   Extremities: Pulses 2+ and equal throughout. No edema. Strength equal throughout.  Integumentary: Warm and dry. Without suspicious looking lesions  Neuro: Alert and oriented, follows commands appropriately.     Orders Placed This Encounter   Procedures     XR Chest 2 Views     Comprehensive Metabolic Panel     Troponin I     Thyroid Cascade     Lipid Cascade RANDOM   Followup: No Follow-up on file. earlier if needed.         Renay Ocoha MD  "

## 2021-06-18 NOTE — PATIENT INSTRUCTIONS - HE
Patient Instructions by Renay Ochoa MD at 1/11/2021  5:45 PM     Author: Renay Ochoa MD Service: -- Author Type: Physician    Filed: 1/11/2021  5:56 PM Encounter Date: 1/11/2021 Status: Signed    : Renay Ochoa MD (Physician)         Patient Education     Diverticulosis    Diverticulosis means that small pouches have formed in the wall of your large intestine (colon). Most often, this problem causes no symptoms and is common as people age. But the pouches in the colon are at risk of becoming infected. When this happens, the condition is called diverticulitis. Although most people with diverticulosis never develop diverticulitis, it is still not uncommon. Rectal bleeding can also occur and in less common situations, a type of colon inflammation called colitis.  While most people do not have symptoms, some people with diverticulosis may have:    Abdominal cramps and pain    Bloating    Constipation    Change in bowel habits  Causes  The exact cause of diverticulosis (and diverticulitis) has not been proved, but a few things are associated with the condition:    Low-fiber diet    Constipation    Lack of exercise  Your healthcare provider will talk with you about how to manage your condition. Diet changes may be all that are needed to help control diverticulosis and prevent progression to diverticulitis. If you develop diverticulitis, you will likely need other treatments.  Home care  You may be told to take fiber supplements daily. Fiber adds bulk to the stool so that it passes through the colon more easily. Stool softeners may be recommended. You may also be given medications for pain relief. Be sure to take all medications as directed.  In the past, people were told to avoid corn, nuts, and seeds. This is no longer necessary.  Follow these guidelines when caring for yourself at home:    Eat unprocessed foods that are high in fiber. Whole grains, fruits, and vegetables are good choices.    Drink 6  to 8 glasses of water every day unless your healthcare provider has you limit how much fluid you should have.    Watch for changes in your bowel movements. Tell your provider if you notice any changes.    Begin an exercise program. Ask your provider how to get started. Generally, walking is the best.    Get plenty of rest and sleep.  Follow-up care  Follow up with your healthcare provider, or as advised. Regular visits may be needed to check on your health. Sometimes special procedures such as colonoscopy, are needed after an episode of diverticulitis or blooding. Be sure to keep all your appointments.  If a stool sample was taken, or cultures were done, you should be told if they are positive, or if your treatment needs to be changed. You can call as directed for the results.  If X-rays were done, a radiologist will look at them. You will be told if there is a change in your treatment.  If antibiotics were prescribed, be sure to finish them all.  When to seek medical advice  Call your healthcare provider right away if any of these occur:    Fever of 100.4 F (38 C) or higher, or as directed by your healthcare provider    Severe cramps in the lower left side of the abdomen or pain that is getting worse    Tenderness in the lower left side of the abdomen or worsening pain throughout the abdomen    Diarrhea or constipation that doesn't get better within 24 hours    Nausea and vomiting    Bleeding from the rectum  Call 911  Call 911 if any of the following occur:    Trouble breathing    Confusion    Very drowsy or trouble awakening    Fainting or loss of consciousness    Rapid heart rate    Chest pain  Date Last Reviewed: 12/30/2015 2000-2017 [employer]. [address] [place] [address], [place] [address]. All rights reserved. This information is not intended as a substitute for professional medical care. Always follow your healthcare professional's instructions.           Patient Education      Hemorrhoids    Hemorrhoids are swollen and inflamed veins inside the rectum and near the anus. The rectum is the last several inches of the colon. The anus is the passage between the rectum and the outside of the body.  Causes  The veins can become swollen due to increased pressure in them. This is most often caused by:    Chronic constipation or diarrhea    Straining when having a bowel movement    Sitting too long on the toilet    A low-fiber diet    Pregnancy  Symptoms    Bleeding from the rectum (this may be noticeable after bowel movements)    Lump near the anus    Itching around the anus    Pain around the anus  There are different types of hemorrhoids. Depending on the type you have and the severity, you may be able to treat yourself at home. In some cases, a procedure may be the best treatment option. Your healthcare provider can tell you more about this, if needed.  Home care  General care    To get relief from pain or itching, try:  ? Medicines. Your healthcare provider may recommend stool softeners, suppositories, or laxatives to help manage constipation. Use these exactly as directed.  ? Sitz baths. A sitz bath involves sitting in a few inches of warm bath water. Be careful not to make the water so hot that you burn yourself--test it before sitting in it. Soak for about 10 to 15 minutes a few times a day. This may help relieve pain.  ? Topical products. Your healthcare provider may prescribe or recommend creams, ointments, or pads that can be applied to the hemorrhoid. Use these exactly as directed.  Tips to help prevent hemorrhoids    Eat more fiber. Fiber adds bulk to stool and absorbs water as it moves through your colon. This makes stool softer and easier to pass.  ? Increase the fiber in your diet with more fiber-rich foods. These include fresh fruit, vegetables, and whole grains.  ? Take a fiber supplement or bulking agent, if advised by your healthcare provider. These include products such as  psyllium or methylcellulose.    Drink more water. Your healthcare provider may direct you to drink plenty of water. This can help keep stool soft.    Be more active. Frequent exercise aids digestion and helps prevent constipation. It may also help make bowel movements more regular.    Dont strain during bowel movements. This can make hemorrhoids more likely. Also, dont sit on the toilet for long periods of time.  Follow-up care  Follow up with your healthcare provider as advised. If a culture or imaging tests were done, someone will let you know the results when they are ready. This may take a few days or longer. If your healthcare provider recommends a procedure for your hemorrhoids, these options can be discussed. Options may include surgery and outpatient office treatments.  When to seek medical advice  Call your healthcare provider right away if any of these occur:    Increased bleeding from the rectum    Increased pain around the rectum or anus    Weakness or dizziness  Call 911  Call 911 if any of these occur:    Trouble breathing or swallowing    Fainting or loss of consciousness    Unusually fast heart rate    Vomiting blood    Large amounts of blood in stool or black, tarry stools  Date Last Reviewed: 9/1/2017 2000-2017 [employer]. [address] [place] [address]. All rights reserved. This information is not intended as a substitute for professional medical care. Always follow your healthcare professional's instructions.           Patient Education     Treating Hemorrhoids: Self-Care    Follow your healthcare providers advice about caring for your hemorrhoids at home. Some treatments help relieve symptoms right away. Others involve making changes in your diet and exercise habits. These can help ease constipation and prevent hemorrhoids from coming back.  Relieving symptoms  Your healthcare provider may prescribe anti-inflammatory medicine to help ease your symptoms. These tips  will also help relieve pain and swelling.    Take sitz baths. Taking a sitz bath means sitting in a few inches of warm bath water. Soaking for 10 minutes twice a day can provide relief from painful hemorrhoids. It can also help the area stay clean.    Develop good bowel habits. Use the bathroom when you need to. Dont ignore the urge to move your bowels. This can lead to constipation, hard stools, and straining. Also, dont read while on the toilet. Sit only as long as needed. Wipe gently with soft, unscented toilet tissue or baby wipes.    Use ice packs. Placing an ice pack on an external hemorrhoid can help relieve pain right away. It will also help reduce the blood clot. Use the ice for 15 to 20 minutes at a time. Keep a cloth between the ice and your skin to prevent skin damage.    Use other measures. Laxatives and enemas can help ease constipation. But use them only on your healthcare providers advice. For symptom relief, try using cotton pads soaked in witch hazel. These are available at most drugstores. Over-the-counter hemorrhoid ointments and petroleum jelly can also provide relief.  Add fiber to your diet  Adding fiber to your diet can help relieve constipation by making stools softer and easier to pass. To increase your fiber intake, your healthcare provider may recommend a bulking agent, such as psyllium. This is a high-fiber supplement available at most grocery stores and drugstores. Eating more fiber-rich foods will also help. There are two types of fiber:    Insoluble fiber is the main ingredient in bulking agents. Its also found in foods such as wheat bran, whole-grain breads, fresh fruits, and vegetables.    Soluble fiber is found in foods such as oat bran. Although soluble fiber is good for you, it may not ease constipation as much as foods high in insoluble fiber.  Drink more water  Along with a high-fiber diet, drinking more water can help ease constipation. This is because insoluble fiber absorbs  water, making stools soft and bulky. Be sure to drink plenty of water throughout the day. Drinking fruit juices, such as prune juice or apple juice, can also help prevent constipation.  Get more exercise  Regular exercise aids digestion and helps prevent constipation. Its also great for your health. So talk with your healthcare provider about starting an exercise program. Low-impact activities, such as swimming or walking, are good places to start. Take it easy at first. And remember to drink plenty of water when you exercise.  High-fiber foods Easy ways to add fiber  High-fiber foods offer many benefits. By making your stools softer, they help heal and prevent swollen hemorrhoids. They may also help reduce the risk of colon and rectal cancer. Best of all, theyre usually low in calories and taste great. Here are some examples of fiber-rich foods.    Whole grains, such as wheat bran, corn bran, and brown rice    Vegetables, especially carrots, broccoli, cabbage, and peas    Fruits, such as apples, bananas, raisins, peaches, prunes, and pears    Nuts and legumes, especially peanuts, lentils, and kidney beans  Easy ways to add fiber  The tips below offer some simple ways to add more high-fiber foods to your meals.    Start your day with a high-fiber breakfast. Eat a wheat bran cereal along with a sliced banana. Or, try peanut butter on whole-wheat toast.    Eat carrot sticks for snacks. Theyre easy to prepare, taste great, and are low in calories.    Use whole-grain breads instead of white bread for sandwiches.    Eat fruits for treats. Try an apple and some raisins instead of a candy bar.  Date Last Reviewed: 7/1/2016 2000-2019 [employer]. [address] [place] [address], [place] [address]. All rights reserved. This information is not intended as a substitute for professional medical care. Always follow your healthcare professional's instructions.

## 2021-06-19 NOTE — LETTER
Letter by Yandel Leslie MD at      Author: Yandel Leslie MD Service: -- Author Type: --    Filed:  Encounter Date: 12/8/2019 Status: Signed         December 8, 2019     Patient: Demario Gordon   YOB: 1970   Date of Visit: 12/8/2019       To Whom It May Concern:    It is my medical opinion that Demario Gordon may return to work on 12/10/19.    If you have any questions or concerns, please don't hesitate to call.    Sincerely,        Electronically signed by Yandel Leslie MD

## 2021-06-19 NOTE — LETTER
Letter by Aureliano Brandon MD at      Author: Aureliano Brandon MD Service: -- Author Type: --    Filed:  Encounter Date: 5/29/2019 Status: (Other)         Demario Gordon  [address] [place] [address]             May 29, 2019         Dear Mr. Gordon,    Below are the results from your recent visit:    Resulted Orders   CT Abdomen Pelvis Without Oral Without IV Contrast    Narrative    EXAM DATE:         05/24/2019    EXAM: CT ABDOMEN, PELVIS W/O CONTRAST  LOCATION: [facility] RADIOLOGY [place]  DATE/TIME: 5/24/2019 8:00 AM    INDICATION: Unspecified abdominal pain, flank pain.  COMPARISON: None.  TECHNIQUE: Helical thin-section CT scan of the abdomen and pelvis was performed  without oral or IV contrast. Multiplanar reformats were obtained. Dose reduction  techniques were used.  CONTRAST: None.    FINDINGS:  LUNG BASES: Negative.    ABDOMEN: The wall of the stomach appears thickened but this may be due to a lack  of distention. The liver, spleen, adrenals and pancreas appear unremarkable for  a noncontrast scan. No renal calcifications or hydronephrosis are seen, no  ureteral calcifications are seen. There does appear to be a tiny mario of  calcification along the wall of the bladder on the left as seen on image 138  series 2, but the ureter inserts superior to this and it does not appear to be  in the ureter, the ureter is not dilated.    PELVIS: There is a normal-appearing appendix. There is no evidence of  diverticulitis.    MUSCULOSKELETAL: Negative.    CONCLUSION:  1.  No renal or ureteral calcifications are seen. No hydronephrosis is seen.  There is a tiny mario of calcium visible along the wall of the left side of the  bladder below the insertion on the left ureter. This could be a recently passed  stone in the bladder, it also may be in the bladder wall or adjacent to the  outer surface of the bladder.    2.  There is a normal appendix. There is no evidence of diverticulitis. There is  a  moderate amount of stool throughout the colon.    3.  The wall of the stomach appears somewhat thickened but this may be due to a  lack of distention.    DICOM format image data is available to non-affiliated external healthcare  facilities or entities on a secure, media-free, reciprocally searchable basis  with patient authorization for at least a 12-month period after the study.                      No kidney or ureteral calcificaitons are seen,     There is a tiny mario of calcium visible along the wall of the left side of the    bladder below the insertion on the left ureter. This could be a recently passed stone in  the bladder  accounting for the pain      No other abnormalities.         Please call with questions or contact us using [employer].    Sincerely,        Electronically signed by Aureliano Brandon MD

## 2021-06-20 NOTE — PROGRESS NOTES
Assessment/Plan:        1. Retrosternal chest pain  Recent exam lab and imaging findings were discussed with the patient  Plan:  - Electrocardiogram Perform and Read  - Echo Stress Exercise; Future    2.  lump of leg  Exam findings were discussed  Plan:  - XR Tibia and Fibula Right; Future  - XR Tibia and Fibula Right               Subjective:    Patient ID:   Demario Gordon is a 47 y.o. male who comes in follow-up of having the chest pain for the past few months, which seems to be more constant in nature, but wax and waning in severity, and  Worse with exertion/ exercise. No other asso sx or pain radiation.   He was seen by his primary care physician on 6/15/2018 and had nl comprehensive metabolic panel, lipid cascade, thyroid, troponin, and normal chest x-ray.     ibuprofen said to be not effective.   he has no family history of cardiovascular disease     2-Left lower leg lump condition noted for the past 2 weeks.  Said to be associate with pain in the beginning but is asymptomatic at this time.      Review of Systems  General : negative  Ophthalmic : negative  ENT : negative  Respiratory : no cough, shortness of breath, or wheezing  Cardiovascular : See HPI   Gastrointestinal : no abdominal pain, change in bowel habits, or black or bloody stools  Genito-Urinary :  no dysuria, trouble voiding, or hematuria  Musculoskeletal : See HPI  Neurological : negative  Dermatological : negative    Allergy: reviewed  Hematological and Lymphatic : negative  Endocrine : negative     The following patient's history were reviewed and updated as appropriate:   He  has no past medical history on file.  He  has a past surgical history that includes Carpal tunnel release (Right).  His family history includes Diabetes in his mother; Hypertension in his mother.  He  reports that he has never smoked. He has never used smokeless tobacco. He reports that he does not drink alcohol or use illicit drugs..      Outpatient Encounter  Prescriptions as of 10/10/2018   Medication Sig Dispense Refill     ammonium lactate (LAC-HYDRIN) 12 % lotion   8     ibuprofen (ADVIL,MOTRIN) 600 MG tablet Take 1 tablet (600 mg total) by mouth every 8 (eight) hours as needed for pain. 30 tablet 0     No facility-administered encounter medications on file as of 10/10/2018.          Objective:   /64 (Patient Site: Right Arm, Patient Position: Sitting, Cuff Size: Adult Large)  Pulse 63  Temp 98.2  F (36.8  C) (Oral)   Wt 208 lb 11.2 oz (94.7 kg)  SpO2 95%  BMI 30.38 kg/m2      Physical Exam  General Appearance:    Alert, well hydrated, no distress,    Eyes:    PERRL, conjunctiva/corneas clear,    Throat:   Lips, mucosa, and tongue normal; teeth and gums normal   Neck:   Supple, symmetrical, trachea midline, no adenopathy;        thyroid:  No enlargement/tenderness/nodules; no carotid    bruit or JVD   Lungs:     Clear to auscultation bilaterally, respirations unlabored   Heart:    Regular rate and rhythm, S1 and S2 normal, sys murmur 2-3/6, rub   or gallop   Abdomen:     Soft, non-tender, normal bowel sounds, no rebound or guarding, no masses, no organomegaly   Extremities:   Palpable protuberant deformity on the anterior distal 1/3 of the shin,   atraumatic, no cyanosis or edema   Skin:   Skin color, texture, turgor normal, no rashes or lesions

## 2021-06-20 NOTE — LETTER
Letter by Percy Tobin MD at      Author: Percy Tobin MD Service: -- Author Type: --    Filed:  Encounter Date: 12/17/2019 Status: Signed         Demario Gordon  [address] [place] [address]             December 17, 2019         Dear Mr. Gordon,    Below are the results from your recent visit: The official radiology interpretation is negative for any pneumonia or any process.    Resulted Orders   XR Chest 2 Views    Narrative    EXAM: XR CHEST 2 VIEWS  LOCATION: [facility]  DATE/TIME: 12/17/2019 8:24 AM    INDICATION: Cough  COMPARISON: 12/8/2019      Impression    Negative chest.            Please call with questions or contact us using [employer].    Sincerely,        Electronically signed by Percy Tobin MD

## 2021-06-26 NOTE — PROGRESS NOTES
Progress Notes by Skylar Moreno PA-C at 12/6/2018  2:40 PM     Author: Skylar Moreno PA-C Service: -- Author Type: Physician Assistant    Filed: 12/6/2018  6:03 PM Encounter Date: 12/6/2018 Status: Signed    : Skylar Moreno PA-C (Physician Assistant)       Chief Complaint   Patient presents with   ? Headache     dizziness       HPI:  Demario Gordon is a 47 y.o. male who presents today complaining of HA and dizziness on and off x 1 week. He was seen in the ED last week for concern of CO poisoning. 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 His levels were unremarkable <3%, per up to date this is considered normal in a nonsmoker. He was dx with a viral URI. His HA and dizziness has continued and is worse when he is out in the cold. His HA is bilateral temporal. He denies any hx of HA disorder such as migraine. He came in today again because while he was at work he felt so dizzy and weak that he thought it was going to fall. He is still having mild dizziness. His HA feels like it's going to split. He reports chills that are intermittent; when he has them he takes Ibuprofen and the chills improve with the medicine. Patient feels significantly queezy with the smell of exhaust. He has not had significant exposure to CO since the last time he was evaluated, but he is curious if the tests could have been too early to be accurate.  She reports significant stressors.  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  He regrets taking this job.  He is also registering for the examination.  He denies any chest pain, shortness of breath, nausea, vomiting, or visual disturbances.  He had stress test done 2 months ago which was normal. Patient is interested in having a work note today.      History obtained from the patient.    Problem List:  2015-01: Normal physical examination  Constipation  Abdominal pain  Internal Hemorrhoids  Myalgia And Myositis  Hand  Injury  Skin Disorder  Pain During Urination (Dysuria)  Urinary Symptoms  Herpes Simplex Type II  External Hemorrhoids  Impaired fasting glucose  Bloating (Symptom)  Herpes Simplex Type I  Acute Sore Throat  Tinea Cruris  Eczema        Social History     Tobacco Use   ? Smoking status: Never Smoker   ? Smokeless tobacco: Never Used   Substance Use Topics   ? Alcohol use: No       Review of Systems   Constitutional: Positive for chills and fatigue.   HENT: Negative for congestion, ear pain, rhinorrhea and sore throat.    Eyes: Negative for visual disturbance.   Respiratory: Negative for cough.    Gastrointestinal: Negative for vomiting.   Neurological: Positive for dizziness and light-headedness.   Psychiatric/Behavioral: Negative for sleep disturbance.       Vitals:    12/06/18 1446 12/06/18 1642 12/06/18 1643   BP: 138/72 136/76 146/84   Patient Site: Right Arm Left Arm Left Arm   Patient Position: Sitting Lying Standing   Cuff Size: Adult Large Adult Large Adult Large   Pulse: (!) 53 (!) 50 63   Resp: 18 16 16   Temp: 98.4  F (36.9  C)     TempSrc: Oral     SpO2: 99% 99% 98%   Weight: 202 lb (91.6 kg)         Physical Exam   Constitutional: He is oriented to person, place, and time. He appears well-developed and well-nourished. No distress.   HENT:   Head: Normocephalic and atraumatic.   Right Ear: External ear normal.   Left Ear: External ear normal.   (-) [place] Hallpike exam   Eyes: Conjunctivae and EOM are normal. Pupils are equal, round, and reactive to light. Right eye exhibits no discharge. Left eye exhibits no discharge. Right eye exhibits no nystagmus. Left eye exhibits no nystagmus.   Neck: Normal range of motion. Neck supple.   Cardiovascular: Normal rate, regular rhythm and normal heart sounds.   Pulmonary/Chest: Effort normal and breath sounds normal. No respiratory distress.   Lymphadenopathy:     He has no cervical adenopathy.   Neurological: He is alert and oriented to person, place, and time. He has  normal strength. No cranial nerve deficit or sensory deficit. He displays a negative Romberg sign. Coordination and gait normal. GCS eye subscore is 4. GCS verbal subscore is 5. GCS motor subscore is 6.   Skin: He is not diaphoretic.   Psychiatric: He has a normal mood and affect. His behavior is normal. Judgment and thought content normal.         Labs:  I have personally ordered and preliminarily reviewed the following EKG, and agree with the cadiologists assessment.     Recent Results (from the past 72 hour(s))   Electrocardiogram Perform and Read   Result Value Ref Range    SYSTOLIC BLOOD PRESSURE  mmHg    DIASTOLIC BLOOD PRESSURE  mmHg    VENTRICULAR RATE 49 BPM    ATRIAL RATE 49 BPM    P-R INTERVAL 198 ms    QRS DURATION 80 ms    Q-T INTERVAL 406 ms    QTC CALCULATION (BEZET) 366 ms    P Axis 20 degrees    R AXIS 3 degrees    T AXIS 23 degrees    MUSE DIAGNOSIS       Sinus bradycardia  Otherwise normal ECG  When compared with ECG of 10-OCT-2018 16:16,  No significant change was found     ISTAT CHEM 7 (HE CLINIC ONLY)   Result Value Ref Range    Sodium 142 136 - 145 mmol/L    Potassium 3.9 3.5 - 5.5 mmol/L    CO2 27 22 - 31 mmol/L    Chloride 103 98 - 107 mmol/L    Anion Gap, Calculation 12 5 - 18 mmol/L    Glucose 87 70 - 125 mg/dL    BUN 9 8 - 22 mg/dL    Creatinine 0.90 0.80 - 1.50 mg/dL   Mononucleosis Screen   Result Value Ref Range    Mono Screen Negative Negative   HM1 (CBC with Diff)   Result Value Ref Range    WBC 6.2 4.0 - 11.0 thou/uL    RBC 4.65 4.40 - 6.20 mill/uL    Hemoglobin 15.1 14.0 - 18.0 g/dL    Hematocrit 44.5 40.0 - 54.0 %    MCV 96 80 - 100 fL    MCH 32.5 27.0 - 34.0 pg    MCHC 34.0 32.0 - 36.0 g/dL    RDW 10.7 (L) 11.0 - 14.5 %    Platelets 186 140 - 440 thou/uL    MPV 8.6 7.0 - 10.0 fL    Neutrophils % 48 (L) 50 - 70 %    Lymphocytes % 44 (H) 20 - 40 %    Monocytes % 5 2 - 10 %    Eosinophils % 2 0 - 6 %    Basophils % 1 0 - 2 %    Neutrophils Absolute 3.0 2.0 - 7.7 thou/uL     Lymphocytes Absolute 2.7 0.8 - 4.4 thou/uL    Monocytes Absolute 0.3 0.0 - 0.9 thou/uL    Eosinophils Absolute 0.1 0.0 - 0.4 thou/uL    Basophils Absolute 0.1 0.0 - 0.2 thou/uL       Radiology:  Xr Chest 2 Views    Result Date: 11/30/2018  XR CHEST 2 VIEWS 11/30/2018 10:18 AM INDICATION: Cough, fever COMPARISON: 06/15/2018 FINDINGS: Lungs are clear. Heart and pulmonary vascularity are normal. No signs of pneumonia.      Clinical Decision Making:  Low suspicion for carbon monoxide poisoning considering less than a week ago he had a normal level and there is not been any cannot no exposures.  Also has history shows symptoms that worsen when he goes outside into fresh air which is not consistent with a carbon monoxide poisoning story line.  Low suspicion for cardiac cause of dizziness as he has a normal EKG today and a normal stress test recently.  His description of dizziness is not significantly consistent with vertigo.  His [place]-Hallpike exam was negative.  He is not having any nystagmus.  Anemia was ruled out.  No signs of lung infection present on lung exam.  He has not orthostatically unstable.  No neural focal deficits.  Low suspicion for stroke as the patient does not have significant risk factors.  The distribution of the headache is most consistent with tension type.  He has no migraine history and this headache is not unilateral or correlating with aura.  Recommend supportive cares for tension headache.  Mono test was also done today due to weakness, it was negative.  Suspect possible malingering as the patient seems concerned with work, and having a note that is open ended (without a return date).    At the end of the encounter, I discussed results, diagnosis, medications. Discussed red flags for immediate return to clinic/ER, as well as indications for follow up if no improvement. Patient understood and agreed to plan. Patient was stable for discharge.    1. Lightheadedness  ISTAT CHEM 7 (HE CLINIC ONLY)     HM1(CBC and Differential)    Electrocardiogram Perform - Clinic    Mononucleosis Screen    Nursing communication    HM1 (CBC with Diff)    Electrocardiogram Perform and Read   2. Tension headache  acetaminophen (TYLENOL) 500 MG tablet         Patient Instructions     1. Your EKG is normal today. I have low suspicion of this being cardiac related.   2 Ruled out anemia, mono, hypoglycemia, today. I have a low suspicion for Carbon Monoxide poisoning considering your symptoms worsen when you are in cold air.   3. Based on the stressors you have, the hard physical labor, and the location of your headache I have a suspicion for a tension type headache. Follow directions on following page.   4. Take Tylenol and or Ibuprofen as needed for pain control.   5. Follow up with  if you are not having any improvement in your symptoms over the course of the next 4 days.       Patient Education     Tension Headaches     Massaging your neck muscles can help relieve tension headache pain.     Tension headaches cause a dull, steady pain on both sides of the head and in the neck and the back of the head. The eyes may also feel tired. Tension headaches can be triggered by muscle tension and clenching, lack of sleep, poor posture, eyestrain, stress, depression, anxiety, arthritis of the neck, and other factors.  To help prevent tension headaches  Take the following steps:    Make sure your work area is properly set up to help you avoid neck strain and eyestrain.    Make sure that your eyeglass prescription is current and is appropriate for the work you do.    Learn techniques for relaxing and reducing emotional stress. These include deep breathing, progressive relaxation, yoga, meditation, and biofeedback.    Maintain a regular exercise regimen under the guidance of a doctor. This can help keep your neck and back flexible, strong, and relaxed.  To relieve the pain  Take the following steps:    Use moist heat to relax the muscles.  Soak in a hot bath or wrap a warm, moist towel around your neck.    Brush your scalp lightly with a soft hairbrush.    Give yourself a massage. Knead the muscles running from your shoulders up the back of your skull.    Use an ice pack. Apply this directly to the place where you feel pain.    Rest. Sleeping often helps relieve headache pain.    Drink plenty of fluids. Dehydration is another trigger for headaches. Don't drink alcohol as this will worsen dehydration.    Use pain medicine sparingly for moderate to severe pain.   Date Last Reviewed: 12/1/2017 2000-2017 [employer]. [address] [place] [address], [place] [address]. All rights reserved. This information is not intended as a substitute for professional medical care. Always follow your healthcare professional's instructions.

## 2021-06-30 NOTE — PROGRESS NOTES
Progress Notes by Renay Ochoa MD at 1/11/2021  5:45 PM     Author: Renay Ochoa MD Service: -- Author Type: Physician    Filed: 1/11/2021  8:34 PM Encounter Date: 1/11/2021 Status: Signed    : Renay Ochoa MD (Physician)         Assessment & Plan     Abdominal pain, left lower quadrant  Discussed results of colonoscopy.  Recommend labs and xr and continue to monitor symptoms.  If worsening symptoms then go to the ER immediately.  - Comprehensive Metabolic Panel  - Urinalysis-UC if Indicated  - XR Abdomen AP  - XR Abdomen AP  - HM1(CBC and Differential)    Hemorrhoids, unspecified hemorrhoid type  Trial of anusol cream.  If no improvement then recommend referral to [facility].  Discussed worsening symptoms should they arise and what to expect.  - hydrocortisone 2.5 % cream  Dispense: 30 g; Refill: 0    Diverticulosis of large intestine without hemorrhage  Continue to monitor symptoms.  Information given about diverticulosis and if worsening symptoms then call the office.      Review of external notes as documented above           15 minutes spent on the date of the encounter doing chart review, history and exam, documentation and further activities as noted above           Return in about 3 months (around 4/11/2021) for Annual physical.    Renay Ochoa MD  [facility]     Demario Gordon is 50 y.o. and presents to clinic today for the following health issues   HPI   Hemorrhoids.  Hx of them previously and had a colonoscopy.    This episode some swelling issue, itching.  Seen by [facility] for the issue.      Symptoms ongoing for the past 2 weeks and resolved 3 days ago.  States noted some blood in the bowl when engaging rectal sphincter.  Then blood on the toilet paper.  No blood noted on stool.        Review of Systems  A review of systems was obtained and is negative other than what is stated in the HPI.         Objective    There were no vitals taken for this  visit.  There is no height or weight on file to calculate BMI.  Physical Exam    General Appearance: Alert and oriented, cooperative, affect appropriate, speech clear, in no apparent distress  Neuro: Alert and oriented, follows commands appropriately.       Renay Ochoa MD  Health Maintenance Review  Health Maintenance   Topic Date Due   ? HEPATITIS C SCREENING  1970   ? TD 18+ HE  12/25/1986   ? TDAP ADULT ONE TIME DOSE  12/25/1986   ? ADVANCE CARE PLANNING  12/01/2015   ? PREVENTIVE CARE VISIT  01/09/2016   ? ZOSTER VACCINES (1 of 2) 12/25/2018   ? INFLUENZA VACCINE RULE BASED (1) 08/01/2020   ? LIPID  06/15/2023   ? COLORECTAL CANCER SCREENING  10/29/2023   ? HIV SCREENING  Completed   ? Pneumococcal Vaccine: Pediatrics (0 to 5 Years) and At-Risk Patients (6 to 64 Years)  Aged Out   ? HEPATITIS B VACCINES  Aged Out         I am having Demario Gordon maintain his ammonium lactate, fish oil-omega-3 fatty acids, acetaminophen, diclofenac sodium, ibuprofen, benzonatate, and codeine-guaiFENesin.        Current Scheduled Meds:  Outpatient Encounter Medications as of 1/11/2021   Medication Sig Dispense Refill   ? acetaminophen (TYLENOL) 500 MG tablet Take 2 tablets (1,000 mg total) by mouth every 6 (six) hours as needed for pain or fever. 60 tablet 0   ? ammonium lactate (LAC-HYDRIN) 12 % lotion   8   ? benzonatate (TESSALON) 200 MG capsule Take 1 capsule (200 mg total) by mouth 3 (three) times a day as needed for cough. 30 capsule 0   ? codeine-guaiFENesin (GUAIFENESIN AC)  mg/5 mL liquid Take 10 mL by mouth 4 (four) times a day as needed. 240 mL 0   ? diclofenac sodium (VOLTAREN) 1 % Gel Apply 1-2 gram to the affected area two times a day as needed 100 g 0   ? ibuprofen (ADVIL,MOTRIN) 800 MG tablet Take 1 tablet (800 mg total) by mouth every 8 (eight) hours as needed for pain. 60 tablet 2   ? omega-3/dha/epa/fish oil (FISH OIL-OMEGA-3 FATTY ACIDS) 300-1,000 mg capsule Take 2 g by mouth daily.       No  facility-administered encounter medications on file as of 1/11/2021.      No past medical history on file.  Past Surgical History:   Procedure Laterality Date   ? CARPAL TUNNEL RELEASE Right      Social History     Tobacco Use   ? Smoking status: Never Smoker   ? Smokeless tobacco: Never Used   Substance Use Topics   ? Alcohol use: No   ? Drug use: No

## 2021-09-27 ENCOUNTER — OFFICE VISIT (OUTPATIENT)
Dept: FAMILY MEDICINE | Facility: CLINIC | Age: 51
End: 2021-09-27
Payer: COMMERCIAL

## 2021-09-27 VITALS
WEIGHT: 220.2 LBS | BODY MASS INDEX: 31.52 KG/M2 | TEMPERATURE: 99.1 F | RESPIRATION RATE: 18 BRPM | SYSTOLIC BLOOD PRESSURE: 140 MMHG | OXYGEN SATURATION: 96 % | HEART RATE: 67 BPM | HEIGHT: 70 IN | DIASTOLIC BLOOD PRESSURE: 75 MMHG

## 2021-09-27 DIAGNOSIS — K62.5 RECTAL BLEEDING: Primary | ICD-10-CM

## 2021-09-27 DIAGNOSIS — R10.9 RIGHT FLANK PAIN: ICD-10-CM

## 2021-09-27 LAB
ALBUMIN UR-MCNC: NEGATIVE MG/DL
APPEARANCE UR: CLEAR
BILIRUB UR QL STRIP: NEGATIVE
COLOR UR AUTO: YELLOW
GLUCOSE UR STRIP-MCNC: NEGATIVE MG/DL
HGB UR QL STRIP: NEGATIVE
KETONES UR STRIP-MCNC: NEGATIVE MG/DL
LEUKOCYTE ESTERASE UR QL STRIP: NEGATIVE
NITRATE UR QL: NEGATIVE
PH UR STRIP: 5.5 [PH] (ref 5–8)
SP GR UR STRIP: 1.02 (ref 1–1.03)
UROBILINOGEN UR STRIP-ACNC: 0.2 E.U./DL

## 2021-09-27 PROCEDURE — 99214 OFFICE O/P EST MOD 30 MIN: CPT | Performed by: FAMILY MEDICINE

## 2021-09-27 PROCEDURE — 81003 URINALYSIS AUTO W/O SCOPE: CPT | Performed by: FAMILY MEDICINE

## 2021-09-27 RX ORDER — CODEINE PHOSPHATE AND GUAIFENESIN 10; 100 MG/5ML; MG/5ML
10 SOLUTION ORAL
COMMUNITY
Start: 2019-12-30 | End: 2021-09-27

## 2021-09-27 RX ORDER — BENZONATATE 200 MG/1
200 CAPSULE ORAL
COMMUNITY
Start: 2019-12-08 | End: 2022-06-22

## 2021-09-27 RX ORDER — HYDROCORTISONE 2.5 %
CREAM (GRAM) TOPICAL
COMMUNITY
Start: 2021-01-11 | End: 2021-09-27

## 2021-09-27 ASSESSMENT — MIFFLIN-ST. JEOR: SCORE: 1847.13

## 2021-09-27 NOTE — LETTER
Demario Gordon  [address] [place] [address]      September 27, 2021      Dear Demario Gordon, has been seen and treated at this medical facility         Sincerely,    Justyna Brandon MD

## 2021-09-27 NOTE — PROGRESS NOTES
"  Assessment & Plan     Rectal bleeding  Consider hemorrhoids vs other pathology   Plan:   - Adult Gastro Ref - Procedure Only; Future    Right flank pain  Patient is concerned with his kidnies.   No urinary symptoms     Plan:   - UA macro with reflex to Microscopic and Culture - Clinc Collect      I spent a total of 33 minutes on the day of the visit.   Time spent doing chart review, history and exam, documentation and further activities per the note             No follow-ups on file.        Subjective   Demario Gordon is a 50 year old male with a history of internal and external hemorrhoids, presenting with rectal bleeding noted a couple of time, once 5 weeks ago and again 2 weeks ago, during BM without pain or any other abdominal symptoms.   He noted dripping of blood during the BM; thinking that its related to some sort of intestinal infection, took a couple of left over Bactrim. The bleeding has stopped since.   He is also having some discomfort in the right flank area better when active or working out,  and wants to rule out kidney or UTI.  He denies any frequency, dyruria or urgency .        Review of Systems         Objective    BP (!) 140/75   Pulse 67   Temp 99.1  F (37.3  C)   Resp 18   Ht 1.765 m (5' 9.5\")   Wt 99.9 kg (220 lb 3.2 oz)   SpO2 96%   BMI 32.05 kg/m    Body mass index is 32.05 kg/m .     Physical Exam   GENERAL: healthy, alert and no distress  ABDOMEN: palpable tenderness to the right flank area, no overlying skin rash             Justyna Brandon MD  [facility]  "

## 2021-10-03 ENCOUNTER — HEALTH MAINTENANCE LETTER (OUTPATIENT)
Age: 51
End: 2021-10-03

## 2021-11-16 ENCOUNTER — HOSPITAL ENCOUNTER (OUTPATIENT)
Facility: AMBULATORY SURGERY CENTER | Age: 51
End: 2021-11-16
Attending: SURGERY
Payer: COMMERCIAL

## 2021-11-24 ENCOUNTER — TRANSFERRED RECORDS (OUTPATIENT)
Dept: HEALTH INFORMATION MANAGEMENT | Facility: CLINIC | Age: 51
End: 2021-11-24
Payer: COMMERCIAL

## 2022-01-13 ENCOUNTER — NURSE TRIAGE (OUTPATIENT)
Dept: NURSING | Facility: CLINIC | Age: 52
End: 2022-01-13
Payer: COMMERCIAL

## 2022-01-13 NOTE — TELEPHONE ENCOUNTER
"Contacted patient who states he was \"exposed to cold weather the day before yesterday\" being outside for much of the day.  Yesterday he had chest pain all day which continues until now.  Rates at 8/10 on \"middle left\" of chest.  Denies any radiation of pain.  Pain not worse on inspiration.  Denies any sob, diaphoresis, nausea or palpitations.  Just has consistent chest pain.  No known injury, no cough.  Nothing makes pain better or worse.  Advised patient should be seen in ED right away for evaluation.  Patient declined and states \"Dr. Brandon told me to let him know if I wanted to be seen.\"  Again advised ED and informed this will be routed to PCP to review.  "

## 2022-01-13 NOTE — TELEPHONE ENCOUNTER
Contacted patient and advised that Dr. Brandon recommends going to ED right away for evaluation.  Patient verbalized understanding and states he will go to [place] ED.

## 2022-01-13 NOTE — TELEPHONE ENCOUNTER
Made an appointment. Just called and sent to [facility] and was told to send message a message to find out when Dr JACKMAN can see him today? Dr Brandon when can you see him today? Please call him at:  [phone].  Thank you,  Caron Dominique RN  [facility]    Additional Information    Negative: Nursing judgment    Nursing judgment    Protocols used: INFORMATION ONLY CALL - NO TRIAGE-A-OH

## 2022-06-22 ENCOUNTER — OFFICE VISIT (OUTPATIENT)
Dept: FAMILY MEDICINE | Facility: CLINIC | Age: 52
End: 2022-06-22
Payer: COMMERCIAL

## 2022-06-22 VITALS
BODY MASS INDEX: 32.02 KG/M2 | SYSTOLIC BLOOD PRESSURE: 188 MMHG | DIASTOLIC BLOOD PRESSURE: 78 MMHG | WEIGHT: 220 LBS | HEART RATE: 70 BPM | OXYGEN SATURATION: 99 %

## 2022-06-22 DIAGNOSIS — R03.0 ELEVATED BLOOD PRESSURE READING WITHOUT DIAGNOSIS OF HYPERTENSION: ICD-10-CM

## 2022-06-22 DIAGNOSIS — R14.0 BLOATING SYMPTOM: Primary | ICD-10-CM

## 2022-06-22 PROBLEM — K57.30 DIVERTICULAR DISEASE OF LARGE INTESTINE: Status: ACTIVE | Noted: 2021-11-24

## 2022-06-22 PROBLEM — D12.4 BENIGN NEOPLASM OF DESCENDING COLON: Status: ACTIVE | Noted: 2021-11-29

## 2022-06-22 PROBLEM — D12.0 BENIGN NEOPLASM OF CECUM: Status: ACTIVE | Noted: 2021-11-29

## 2022-06-22 PROBLEM — K63.5 POLYP OF COLON: Status: ACTIVE | Noted: 2021-11-24

## 2022-06-22 PROBLEM — D12.3 BENIGN NEOPLASM OF TRANSVERSE COLON: Status: ACTIVE | Noted: 2021-11-29

## 2022-06-22 LAB
BASOPHILS # BLD AUTO: 0 10E3/UL (ref 0–0.2)
BASOPHILS NFR BLD AUTO: 1 %
EOSINOPHIL # BLD AUTO: 0.1 10E3/UL (ref 0–0.7)
EOSINOPHIL NFR BLD AUTO: 1 %
ERYTHROCYTE [DISTWIDTH] IN BLOOD BY AUTOMATED COUNT: 11.3 % (ref 10–15)
HCT VFR BLD AUTO: 41.5 % (ref 40–53)
HGB BLD-MCNC: 14.6 G/DL (ref 13.3–17.7)
IMM GRANULOCYTES # BLD: 0 10E3/UL
IMM GRANULOCYTES NFR BLD: 1 %
LYMPHOCYTES # BLD AUTO: 1.8 10E3/UL (ref 0.8–5.3)
LYMPHOCYTES NFR BLD AUTO: 31 %
MCH RBC QN AUTO: 32 PG (ref 26.5–33)
MCHC RBC AUTO-ENTMCNC: 35.2 G/DL (ref 31.5–36.5)
MCV RBC AUTO: 91 FL (ref 78–100)
MONOCYTES # BLD AUTO: 0.5 10E3/UL (ref 0–1.3)
MONOCYTES NFR BLD AUTO: 8 %
NEUTROPHILS # BLD AUTO: 3.3 10E3/UL (ref 1.6–8.3)
NEUTROPHILS NFR BLD AUTO: 58 %
PLATELET # BLD AUTO: 206 10E3/UL (ref 150–450)
RBC # BLD AUTO: 4.56 10E6/UL (ref 4.4–5.9)
WBC # BLD AUTO: 5.7 10E3/UL (ref 4–11)

## 2022-06-22 PROCEDURE — 87338 HPYLORI STOOL AG IA: CPT | Performed by: FAMILY MEDICINE

## 2022-06-22 PROCEDURE — 87177 OVA AND PARASITES SMEARS: CPT | Performed by: FAMILY MEDICINE

## 2022-06-22 PROCEDURE — 85025 COMPLETE CBC W/AUTO DIFF WBC: CPT | Performed by: FAMILY MEDICINE

## 2022-06-22 PROCEDURE — 36415 COLL VENOUS BLD VENIPUNCTURE: CPT | Performed by: FAMILY MEDICINE

## 2022-06-22 PROCEDURE — 99214 OFFICE O/P EST MOD 30 MIN: CPT | Performed by: FAMILY MEDICINE

## 2022-06-22 PROCEDURE — 87209 SMEAR COMPLEX STAIN: CPT | Performed by: FAMILY MEDICINE

## 2022-06-22 RX ORDER — SIMETHICONE 80 MG
80 TABLET,CHEWABLE ORAL EVERY 6 HOURS PRN
Qty: 30 TABLET | Refills: 1 | Status: SHIPPED | OUTPATIENT
Start: 2022-06-22

## 2022-06-22 NOTE — LETTER
June 22, 2022      Demario Gordon  [address] [place] [address]        Dear ,    We are writing to inform you of your test results.    Your white blood cell count and eosinophil count are normal, reassuring against a parasitic infection.    Resulted Orders   CBC with platelets and differential   Result Value Ref Range    WBC Count 5.7 4.0 - 11.0 10e3/uL    RBC Count 4.56 4.40 - 5.90 10e6/uL    Hemoglobin 14.6 13.3 - 17.7 g/dL    Hematocrit 41.5 40.0 - 53.0 %    MCV 91 78 - 100 fL    MCH 32.0 26.5 - 33.0 pg    MCHC 35.2 31.5 - 36.5 g/dL    RDW 11.3 10.0 - 15.0 %    Platelet Count 206 150 - 450 10e3/uL    % Neutrophils 58 %    % Lymphocytes 31 %    % Monocytes 8 %    % Eosinophils 1 %    % Basophils 1 %    % Immature Granulocytes 1 %    Absolute Neutrophils 3.3 1.6 - 8.3 10e3/uL    Absolute Lymphocytes 1.8 0.8 - 5.3 10e3/uL    Absolute Monocytes 0.5 0.0 - 1.3 10e3/uL    Absolute Eosinophils 0.1 0.0 - 0.7 10e3/uL    Absolute Basophils 0.0 0.0 - 0.2 10e3/uL    Absolute Immature Granulocytes 0.0 <=0.4 10e3/uL       If you have any questions or concerns, please call the clinic at the number listed above.       Sincerely,      Dina Butler MD

## 2022-06-22 NOTE — PROGRESS NOTES
"  Assessment & Plan     Bloating symptom  Discussed with patient that this is likely related to his high-fiber diet with drinking plenty of fluids.  His last colonoscopy had some polyps, he will be due for repeat in November 2024.  His stools have been normal.  Discussed with him that parasitic infections are rare in the [place], especially if he does not consume raw meat, but he would really like to be tested today.  We will check his eosinophil count along with stool ova and parasites along with H. pylori.  In the meantime, he will try simethicone for symptomatic relief.  - simethicone (MYLICON) 80 MG chewable tablet; Take 1 tablet (80 mg) by mouth every 6 hours as needed for flatulence or cramping  - Helicobacter pylori Antigen Stool; Future  - Ova and Parasites ([facility]); Future  - Routine parasitology exam; Future  - CBC with platelets and differential    Elevated blood pressure reading without diagnosis of hypertension  Omitted rechecking patient's blood pressure before he left today.  We are running behind and he was in a hurry.  Recommend recheck at a complete physical exam in the near future.          {Provider  Link to [facility] Help Grid :104059}     BMI:   Estimated body mass index is 32.02 kg/m  as calculated from the following:    Height as of 9/27/21: 1.765 m (5' 9.5\").    Weight as of this encounter: 99.8 kg (220 lb).           Return in about 4 weeks (around 7/20/2022) for Routine preventive.    Dina Butler MD  [facility]    Adolfo Hanks is a 51 year old, presenting for the following health issues:  worms      History of Present Illness       Reason for visit:  Blotting and want to check, if I have any parasites    He eats 2-3 servings of fruits and vegetables daily.He consumes 0 sweetened beverage(s) daily.He exercises with enough effort to increase his heart rate 30 to 60 minutes per day.  He exercises with enough effort to increase his heart rate 6 days " "per week.   He is taking medications regularly.     Patient presents today with a concern for possible \"worms\", or \"parasites\".  He states that he has had a bloating symptom with some flatulence and eructation for quite some time.  He had a colonoscopy in November 2021 which revealed some polyps.  He will be due for repeat in 3 years.  He was advised to try a fiber supplement, which he states made no difference in his symptoms.  He does eat quite a few vegetables, but reports these are usually cooked vegetables, drinks plenty of fluids throughout the day.  This is bothersome enough that he suspects he may have a parasitic infection.  He is originally from [facility] but has lived in the [place] for about 15 years.  He does not consume any pork, never eats raw meat.  He was never diagnosed with a parasitic infection as a young person in [facility] [place].  He reports his stools have been normal, not black or bloody.  He has no other questions or concerns today.      Review of Systems   Constitutional, HEENT, cardiovascular, pulmonary, gi and gu systems are negative, except as otherwise noted.      Objective    BP (!) 188/78   Pulse 70   Wt 99.8 kg (220 lb)   SpO2 99%   BMI 32.02 kg/m    Body mass index is 32.02 kg/m .  Physical Exam   GENERAL: healthy, alert and no distress  EYES: Eyes grossly normal to inspection, PERRL and conjunctivae and sclerae normal  RESP: Breathing comfortably, no cough during the visit  ABDOMEN: soft, nontender, no hepatosplenomegaly, no masses and bowel sounds normal  SKIN: no suspicious lesions or rashes  NEURO: Normal strength and tone, mentation intact and speech normal  PSYCH: mentation appears normal, affect normal/bright    Diagnostics: Pending                .  ..  "

## 2022-06-22 NOTE — LETTER
June 23, 2022      Demario Gordon  [address] [place] [address]        Dear ,    We are writing to inform you of your test results.    Your labs look great.  No elevated eosinophil count concerning for parasitic infection.  Negative stool test, negative for H. pylori.  Try the gas medicine as we discussed.  If no improvement, let me know if you would like to see a gastroenterologist.    Resulted Orders   CBC with platelets and differential   Result Value Ref Range    WBC Count 5.7 4.0 - 11.0 10e3/uL    RBC Count 4.56 4.40 - 5.90 10e6/uL    Hemoglobin 14.6 13.3 - 17.7 g/dL    Hematocrit 41.5 40.0 - 53.0 %    MCV 91 78 - 100 fL    MCH 32.0 26.5 - 33.0 pg    MCHC 35.2 31.5 - 36.5 g/dL    RDW 11.3 10.0 - 15.0 %    Platelet Count 206 150 - 450 10e3/uL    % Neutrophils 58 %    % Lymphocytes 31 %    % Monocytes 8 %    % Eosinophils 1 %    % Basophils 1 %    % Immature Granulocytes 1 %    Absolute Neutrophils 3.3 1.6 - 8.3 10e3/uL    Absolute Lymphocytes 1.8 0.8 - 5.3 10e3/uL    Absolute Monocytes 0.5 0.0 - 1.3 10e3/uL    Absolute Eosinophils 0.1 0.0 - 0.7 10e3/uL    Absolute Basophils 0.0 0.0 - 0.2 10e3/uL    Absolute Immature Granulocytes 0.0 <=0.4 10e3/uL   Helicobacter pylori Antigen Stool   Result Value Ref Range    Helicobacter pylori Antigen Stool Negative Negative      Comment:      Negative for Helicobacter pylori antigen by enzyme immunoassay. A negative result indicates the absence of H. pylori antigen or that the level of antigen is below the level of detection.   Routine parasitology exam   Result Value Ref Range    OVA AND PARASITE EXAM Negative Negative      Comment:      A single negative specimen does not rule out parasitic infection.    Narrative    Cryptosporidium, Cyclospora and Microsporidia are not readily detected by this method.       If you have any questions or concerns, please call the clinic at the number listed above.       Sincerely,      Dina Butler MD

## 2022-06-23 LAB
H PYLORI AG STL QL IA: NEGATIVE
O+P STL MICRO: NEGATIVE

## 2022-07-10 ENCOUNTER — HEALTH MAINTENANCE LETTER (OUTPATIENT)
Age: 52
End: 2022-07-10

## 2022-09-11 ENCOUNTER — HEALTH MAINTENANCE LETTER (OUTPATIENT)
Age: 52
End: 2022-09-11

## 2023-01-09 ENCOUNTER — OFFICE VISIT (OUTPATIENT)
Dept: FAMILY MEDICINE | Facility: CLINIC | Age: 53
End: 2023-01-09
Payer: COMMERCIAL

## 2023-01-09 ENCOUNTER — ANCILLARY PROCEDURE (OUTPATIENT)
Dept: GENERAL RADIOLOGY | Facility: CLINIC | Age: 53
End: 2023-01-09
Attending: PHYSICIAN ASSISTANT
Payer: COMMERCIAL

## 2023-01-09 VITALS
BODY MASS INDEX: 31.44 KG/M2 | HEART RATE: 63 BPM | SYSTOLIC BLOOD PRESSURE: 159 MMHG | OXYGEN SATURATION: 97 % | RESPIRATION RATE: 16 BRPM | TEMPERATURE: 98.9 F | WEIGHT: 216 LBS | DIASTOLIC BLOOD PRESSURE: 70 MMHG

## 2023-01-09 DIAGNOSIS — R03.0 ELEVATED BLOOD PRESSURE READING WITHOUT DIAGNOSIS OF HYPERTENSION: ICD-10-CM

## 2023-01-09 DIAGNOSIS — R07.9 CHEST PAIN, UNSPECIFIED TYPE: ICD-10-CM

## 2023-01-09 DIAGNOSIS — M79.10 MUSCLE PAIN: Primary | ICD-10-CM

## 2023-01-09 LAB
ALBUMIN SERPL BCG-MCNC: 4.3 G/DL (ref 3.5–5.2)
ALP SERPL-CCNC: 59 U/L (ref 40–129)
ALT SERPL W P-5'-P-CCNC: 26 U/L (ref 10–50)
ANION GAP SERPL CALCULATED.3IONS-SCNC: 10 MMOL/L (ref 7–15)
AST SERPL W P-5'-P-CCNC: 29 U/L (ref 10–50)
BASOPHILS # BLD AUTO: 0 10E3/UL (ref 0–0.2)
BASOPHILS NFR BLD AUTO: 1 %
BILIRUB SERPL-MCNC: 0.8 MG/DL
BUN SERPL-MCNC: 8.6 MG/DL (ref 6–20)
CALCIUM SERPL-MCNC: 9.1 MG/DL (ref 8.6–10)
CHLORIDE SERPL-SCNC: 107 MMOL/L (ref 98–107)
CREAT SERPL-MCNC: 0.78 MG/DL (ref 0.67–1.17)
DEPRECATED HCO3 PLAS-SCNC: 22 MMOL/L (ref 22–29)
EOSINOPHIL # BLD AUTO: 0.2 10E3/UL (ref 0–0.7)
EOSINOPHIL NFR BLD AUTO: 3 %
ERYTHROCYTE [DISTWIDTH] IN BLOOD BY AUTOMATED COUNT: 11.4 % (ref 10–15)
GFR SERPL CREATININE-BSD FRML MDRD: >90 ML/MIN/1.73M2
GLUCOSE SERPL-MCNC: 102 MG/DL (ref 70–99)
HCT VFR BLD AUTO: 41.7 % (ref 40–53)
HGB BLD-MCNC: 14.5 G/DL (ref 13.3–17.7)
IMM GRANULOCYTES # BLD: 0 10E3/UL
IMM GRANULOCYTES NFR BLD: 1 %
LYMPHOCYTES # BLD AUTO: 1.6 10E3/UL (ref 0.8–5.3)
LYMPHOCYTES NFR BLD AUTO: 27 %
MCH RBC QN AUTO: 32.2 PG (ref 26.5–33)
MCHC RBC AUTO-ENTMCNC: 34.8 G/DL (ref 31.5–36.5)
MCV RBC AUTO: 93 FL (ref 78–100)
MONOCYTES # BLD AUTO: 0.4 10E3/UL (ref 0–1.3)
MONOCYTES NFR BLD AUTO: 7 %
NEUTROPHILS # BLD AUTO: 3.7 10E3/UL (ref 1.6–8.3)
NEUTROPHILS NFR BLD AUTO: 62 %
PLATELET # BLD AUTO: 202 10E3/UL (ref 150–450)
POTASSIUM SERPL-SCNC: 4.3 MMOL/L (ref 3.4–5.3)
PROT SERPL-MCNC: 7 G/DL (ref 6.4–8.3)
RBC # BLD AUTO: 4.5 10E6/UL (ref 4.4–5.9)
SODIUM SERPL-SCNC: 139 MMOL/L (ref 136–145)
WBC # BLD AUTO: 5.9 10E3/UL (ref 4–11)

## 2023-01-09 PROCEDURE — 80053 COMPREHEN METABOLIC PANEL: CPT | Performed by: PHYSICIAN ASSISTANT

## 2023-01-09 PROCEDURE — 85025 COMPLETE CBC W/AUTO DIFF WBC: CPT | Performed by: PHYSICIAN ASSISTANT

## 2023-01-09 PROCEDURE — 36415 COLL VENOUS BLD VENIPUNCTURE: CPT | Performed by: PHYSICIAN ASSISTANT

## 2023-01-09 PROCEDURE — 71046 X-RAY EXAM CHEST 2 VIEWS: CPT | Mod: TC | Performed by: RADIOLOGY

## 2023-01-09 PROCEDURE — 99214 OFFICE O/P EST MOD 30 MIN: CPT | Performed by: PHYSICIAN ASSISTANT

## 2023-01-09 RX ORDER — CAPSAICIN 0.025 %
2 CREAM (GRAM) TOPICAL 3 TIMES DAILY PRN
Qty: 118 ML | Refills: 0 | Status: SHIPPED | OUTPATIENT
Start: 2023-01-09

## 2023-01-09 NOTE — PROGRESS NOTES
Chief Complaint   Patient presents with     Chest Pain     X1 month       Shoulder Pain     X1 month         ASSESSMENT/PLAN:  Demario was seen today for chest pain and shoulder pain.    Diagnoses and all orders for this visit:    Muscle pain  -     capsaicin (ZOSTRIX) 0.025 % external cream; Apply 2 g topically 3 times daily as needed (pain)    Elevated blood pressure reading without diagnosis of hypertension  -     CBC with platelets and differential; Future  -     Comprehensive metabolic panel (BMP + Alb, Alk Phos, ALT, AST, Total. Bili, TP); Future  -     CBC with platelets and differential  -     Comprehensive metabolic panel (BMP + Alb, Alk Phos, ALT, AST, Total. Bili, TP)    Chest pain, unspecified type  -     XR Chest 2 Views; Future  -     CBC with platelets and differential; Future  -     Comprehensive metabolic panel (BMP + Alb, Alk Phos, ALT, AST, Total. Bili, TP); Future  -     CBC with platelets and differential  -     Comprehensive metabolic panel (BMP + Alb, Alk Phos, ALT, AST, Total. Bili, TP)      Patient's symptoms seem to be very musculoskeletal.  Differential diagnosis does include MI, costochondritis, PE, mass among others.  Chest x-ray did not show any masses.  CBC within normal limits.  Checking a metabolic panel to look for electrolyte abnormalities.  Patient is also due for his annual exam.  Recommend supportive care and follow-up with PCP if not improving  Capsaicin cream may be beneficial for acute pain, continue massage  Nik Perez PA-C      SUBJECTIVE:  Demario is a 52 year old male who presents to urgent care with 1 month of intermittent left-sided chest pain.  The sharp stabbing sensation that will radiate to his back.  No particular things make it better or worse.  Does push-ups every day along with walking about 5 miles and some other fitness things.  Does not notice any significant symptoms get worse with this and probably get better during exercise.  Sometimes also have some  pain that radiates into his shoulder and when he sleeps at night his left arm will fall asleep and get numb and tingly.  This improves when he moves positions.  No shortness of breath, lightheadedness, excessive diaphoresis.  No history of cardiac disease.  Has not been diagnosed with hypertension but has had elevated blood pressure readings in the past  When he experiences the pain massage does help  No history of back or neck injuries.    ROS: Pertinent ROS neg other than the symptoms noted above in the HPI.     OBJECTIVE:  BP (!) 159/70 (BP Location: Right arm, Patient Position: Sitting, Cuff Size: Adult Large)   Pulse 63   Temp 98.9  F (37.2  C) (Oral)   Resp 16   Wt 98 kg (216 lb)   SpO2 97%   BMI 31.44 kg/m     GENERAL: healthy, alert and no distress  NECK: no adenopathy, full range of motion,  RESP: lungs clear to auscultation - no rales, rhonchi or wheezes  CV: regular rate and rhythm, normal S1 S2, no S3 or S4, no murmur, click or rub, no peripheral edema and peripheral pulses strong  MS: no gross musculoskeletal defects noted, no edema, no tenderness over the chest, back, shoulder.  Left upper extremity: No tenderness, abnormality or swelling noted.  Empty can test negative, Cunha negative, Gerbers negative.  Full range of motion.  Tinel's test at the left wrist negative  SKIN: no suspicious lesions or rashes  NEURO: Normal strength and tone, mentation intact and speech normal    DIAGNOSTICS  Xray - Reviewed and interpreted by me.  Some  spherical calcifications likely granulomatous seen bilaterally but otherwise no acute cardiopulmonary abnormality noted  Results for orders placed or performed in visit on 01/09/23   CBC with platelets and differential     Status: None   Result Value Ref Range    WBC Count 5.9 4.0 - 11.0 10e3/uL    RBC Count 4.50 4.40 - 5.90 10e6/uL    Hemoglobin 14.5 13.3 - 17.7 g/dL    Hematocrit 41.7 40.0 - 53.0 %    MCV 93 78 - 100 fL    MCH 32.2 26.5 - 33.0 pg    MCHC 34.8  31.5 - 36.5 g/dL    RDW 11.4 10.0 - 15.0 %    Platelet Count 202 150 - 450 10e3/uL    % Neutrophils 62 %    % Lymphocytes 27 %    % Monocytes 7 %    % Eosinophils 3 %    % Basophils 1 %    % Immature Granulocytes 1 %    Absolute Neutrophils 3.7 1.6 - 8.3 10e3/uL    Absolute Lymphocytes 1.6 0.8 - 5.3 10e3/uL    Absolute Monocytes 0.4 0.0 - 1.3 10e3/uL    Absolute Eosinophils 0.2 0.0 - 0.7 10e3/uL    Absolute Basophils 0.0 0.0 - 0.2 10e3/uL    Absolute Immature Granulocytes 0.0 <=0.4 10e3/uL   CBC with platelets and differential     Status: None    Narrative    The following orders were created for panel order CBC with platelets and differential.  Procedure                               Abnormality         Status                     ---------                               -----------         ------                     CBC with platelets and d...[[id number]]                      Final result                 Please view results for these tests on the individual orders.        Current Outpatient Medications   Medication     acetaminophen (TYLENOL) 500 MG tablet     ibuprofen (ADVIL,MOTRIN) 800 MG tablet     omega-3/dha/epa/fish oil (FISH OIL-OMEGA-3 FATTY ACIDS) 300-1,000 mg capsule     ammonium lactate (LAC-HYDRIN) 12 % lotion     hydrocortisone 2.5 % cream     simethicone (MYLICON) 80 MG chewable tablet     No current facility-administered medications for this visit.      Patient Active Problem List   Diagnosis     Internal Hemorrhoids     Myalgia And Myositis     Skin Disorder     External Hemorrhoids     Tinea Cruris     Polyp of colon     Diverticular disease of large intestine     Benign neoplasm of transverse colon     Benign neoplasm of descending colon     Benign neoplasm of cecum     Elevated blood pressure reading without diagnosis of hypertension     Bloating symptom      No past medical history on file.  Past Surgical History:   Procedure Laterality Date     RELEASE CARPAL TUNNEL Right      Family History    Problem Relation Age of Onset     Diabetes Mother      Hypertension Mother      Social History     Tobacco Use     Smoking status: Never     Smokeless tobacco: Never   Substance Use Topics     Alcohol use: No              The plan of care was discussed with the patient. They understand and agree with the course of treatment prescribed. A printed summary was given including instructions and medications.  The use of Dragon/[employer] dictation services may have been used to construct the content in this note; any grammatical or spelling errors are non-intentional. Please contact the author of this note directly if you are in need of any clarification.

## 2023-03-08 ENCOUNTER — TRANSFERRED RECORDS (OUTPATIENT)
Dept: HEALTH INFORMATION MANAGEMENT | Facility: CLINIC | Age: 53
End: 2023-03-08
Payer: COMMERCIAL

## 2023-04-27 ENCOUNTER — TRANSFERRED RECORDS (OUTPATIENT)
Dept: HEALTH INFORMATION MANAGEMENT | Facility: CLINIC | Age: 53
End: 2023-04-27
Payer: COMMERCIAL

## 2023-05-01 ENCOUNTER — TRANSFERRED RECORDS (OUTPATIENT)
Dept: HEALTH INFORMATION MANAGEMENT | Facility: CLINIC | Age: 53
End: 2023-05-01
Payer: COMMERCIAL

## 2023-05-15 ENCOUNTER — HOSPITAL ENCOUNTER (OUTPATIENT)
Dept: GENERAL RADIOLOGY | Facility: HOSPITAL | Age: 53
Discharge: HOME | End: 2023-05-15
Attending: FAMILY MEDICINE
Payer: COMMERCIAL

## 2023-05-15 ENCOUNTER — OFFICE VISIT (OUTPATIENT)
Dept: FAMILY MEDICINE | Facility: CLINIC | Age: 53
End: 2023-05-15
Payer: COMMERCIAL

## 2023-05-15 VITALS
TEMPERATURE: 98.6 F | HEART RATE: 72 BPM | WEIGHT: 224.5 LBS | HEIGHT: 70 IN | RESPIRATION RATE: 16 BRPM | OXYGEN SATURATION: 100 % | DIASTOLIC BLOOD PRESSURE: 67 MMHG | BODY MASS INDEX: 32.14 KG/M2 | SYSTOLIC BLOOD PRESSURE: 173 MMHG

## 2023-05-15 DIAGNOSIS — G89.29 CHRONIC PAIN OF RIGHT KNEE: ICD-10-CM

## 2023-05-15 DIAGNOSIS — R73.9 ELEVATED RANDOM BLOOD GLUCOSE LEVEL: ICD-10-CM

## 2023-05-15 DIAGNOSIS — M25.561 CHRONIC PAIN OF RIGHT KNEE: ICD-10-CM

## 2023-05-15 DIAGNOSIS — G89.29 CHRONIC PAIN OF RIGHT ANKLE: ICD-10-CM

## 2023-05-15 DIAGNOSIS — R07.89 ANTERIOR CHEST WALL PAIN: ICD-10-CM

## 2023-05-15 DIAGNOSIS — Z11.59 NEED FOR HEPATITIS C SCREENING TEST: ICD-10-CM

## 2023-05-15 DIAGNOSIS — M25.571 CHRONIC PAIN OF RIGHT ANKLE: ICD-10-CM

## 2023-05-15 DIAGNOSIS — Z12.11 SPECIAL SCREENING FOR MALIGNANT NEOPLASMS, COLON: ICD-10-CM

## 2023-05-15 DIAGNOSIS — Z23 NEED FOR VACCINATION: ICD-10-CM

## 2023-05-15 DIAGNOSIS — Z12.5 SCREENING FOR PROSTATE CANCER: ICD-10-CM

## 2023-05-15 DIAGNOSIS — Z00.00 ROUTINE GENERAL MEDICAL EXAMINATION AT A HEALTH CARE FACILITY: Primary | ICD-10-CM

## 2023-05-15 LAB
ALBUMIN SERPL BCG-MCNC: 4.5 G/DL (ref 3.5–5.2)
ALP SERPL-CCNC: 61 U/L (ref 40–129)
ALT SERPL W P-5'-P-CCNC: 19 U/L (ref 10–50)
ANION GAP SERPL CALCULATED.3IONS-SCNC: 14 MMOL/L (ref 7–15)
AST SERPL W P-5'-P-CCNC: 23 U/L (ref 10–50)
BILIRUB SERPL-MCNC: 0.5 MG/DL
BUN SERPL-MCNC: 9.5 MG/DL (ref 6–20)
CALCIUM SERPL-MCNC: 9 MG/DL (ref 8.6–10)
CHLORIDE SERPL-SCNC: 106 MMOL/L (ref 98–107)
CHOLEST SERPL-MCNC: 192 MG/DL
CREAT SERPL-MCNC: 0.8 MG/DL (ref 0.67–1.17)
DEPRECATED HCO3 PLAS-SCNC: 22 MMOL/L (ref 22–29)
ERYTHROCYTE [DISTWIDTH] IN BLOOD BY AUTOMATED COUNT: 11.3 % (ref 10–15)
GFR SERPL CREATININE-BSD FRML MDRD: >90 ML/MIN/1.73M2
GLUCOSE SERPL-MCNC: 129 MG/DL (ref 70–99)
HCT VFR BLD AUTO: 42.6 % (ref 40–53)
HCV AB SERPL QL IA: NONREACTIVE
HDLC SERPL-MCNC: 50 MG/DL
HGB BLD-MCNC: 15.5 G/DL (ref 13.3–17.7)
LDLC SERPL CALC-MCNC: 124 MG/DL
MCH RBC QN AUTO: 32.6 PG (ref 26.5–33)
MCHC RBC AUTO-ENTMCNC: 36.4 G/DL (ref 31.5–36.5)
MCV RBC AUTO: 90 FL (ref 78–100)
NONHDLC SERPL-MCNC: 142 MG/DL
PLATELET # BLD AUTO: 181 10E3/UL (ref 150–450)
POTASSIUM SERPL-SCNC: 4.1 MMOL/L (ref 3.4–5.3)
PROT SERPL-MCNC: 7.1 G/DL (ref 6.4–8.3)
PSA SERPL DL<=0.01 NG/ML-MCNC: 0.57 NG/ML (ref 0–3.5)
RBC # BLD AUTO: 4.75 10E6/UL (ref 4.4–5.9)
SODIUM SERPL-SCNC: 142 MMOL/L (ref 136–145)
TRIGL SERPL-MCNC: 88 MG/DL
WBC # BLD AUTO: 5.9 10E3/UL (ref 4–11)

## 2023-05-15 PROCEDURE — 73610 X-RAY EXAM OF ANKLE: CPT | Mod: RT

## 2023-05-15 PROCEDURE — 90746 HEPB VACCINE 3 DOSE ADULT IM: CPT | Performed by: FAMILY MEDICINE

## 2023-05-15 PROCEDURE — 90472 IMMUNIZATION ADMIN EACH ADD: CPT | Performed by: FAMILY MEDICINE

## 2023-05-15 PROCEDURE — 99396 PREV VISIT EST AGE 40-64: CPT | Mod: 25 | Performed by: FAMILY MEDICINE

## 2023-05-15 PROCEDURE — 73562 X-RAY EXAM OF KNEE 3: CPT | Mod: RT

## 2023-05-15 PROCEDURE — 86803 HEPATITIS C AB TEST: CPT | Performed by: FAMILY MEDICINE

## 2023-05-15 PROCEDURE — 99214 OFFICE O/P EST MOD 30 MIN: CPT | Mod: 25 | Performed by: FAMILY MEDICINE

## 2023-05-15 PROCEDURE — G0103 PSA SCREENING: HCPCS | Performed by: FAMILY MEDICINE

## 2023-05-15 PROCEDURE — 85027 COMPLETE CBC AUTOMATED: CPT | Performed by: FAMILY MEDICINE

## 2023-05-15 PROCEDURE — 80061 LIPID PANEL: CPT | Performed by: FAMILY MEDICINE

## 2023-05-15 PROCEDURE — 36415 COLL VENOUS BLD VENIPUNCTURE: CPT | Performed by: FAMILY MEDICINE

## 2023-05-15 PROCEDURE — 80053 COMPREHEN METABOLIC PANEL: CPT | Performed by: FAMILY MEDICINE

## 2023-05-15 PROCEDURE — 90715 TDAP VACCINE 7 YRS/> IM: CPT | Performed by: FAMILY MEDICINE

## 2023-05-15 PROCEDURE — 90471 IMMUNIZATION ADMIN: CPT | Performed by: FAMILY MEDICINE

## 2023-05-15 RX ORDER — IBUPROFEN 800 MG/1
800 TABLET, FILM COATED ORAL EVERY 8 HOURS PRN
Qty: 60 TABLET | Refills: 2 | Status: SHIPPED | OUTPATIENT
Start: 2023-05-15

## 2023-05-15 ASSESSMENT — ENCOUNTER SYMPTOMS
SORE THROAT: 0
HEMATOCHEZIA: 0
FEVER: 0
HEMATURIA: 0
NERVOUS/ANXIOUS: 1
ABDOMINAL PAIN: 0
FREQUENCY: 0
CONSTIPATION: 1
HEARTBURN: 0
DIARRHEA: 0
PALPITATIONS: 0
ARTHRALGIAS: 1
PARESTHESIAS: 0
CHILLS: 0
MYALGIAS: 1
DYSURIA: 0
JOINT SWELLING: 1
HEADACHES: 0
EYE PAIN: 0
NAUSEA: 0
SHORTNESS OF BREATH: 0
COUGH: 0
WEAKNESS: 0
DIZZINESS: 0

## 2023-05-15 ASSESSMENT — PAIN SCALES - GENERAL: PAINLEVEL: EXTREME PAIN (8)

## 2023-05-15 NOTE — PROGRESS NOTES
SUBJECTIVE:   CC: Demario is an 52 year old who presents for preventative health visit.       6/22/2022    12:00 PM   Additional Questions   Roomed by Zuleyma NESS     Patient has been advised of split billing requirements and indicates understanding: Yes  Healthy Habits:     Getting at least 3 servings of Calcium per day:  Yes    Bi-annual eye exam:  NO    Dental care twice a year:  Yes    Sleep apnea or symptoms of sleep apnea:  None    Diet:  Regular (no restrictions), Low fat/cholesterol, Carbohydrate counting and Breakfast skipped    Frequency of exercise:  6-7 days/week    Duration of exercise:  15-30 minutes    Taking medications regularly:  Not Applicable    Medication side effects:  Not applicable and Muscle aches    PHQ-2 Total Score: 2    Additional concerns today:  Yes    PROBLEMS TO ADD ON...    Patient presents with:  Physical  Forms  needs to review FMLA based on chronic right ankle / knee pain   Limiting working ours to no more than 40 hours.       Today's PHQ-2 Score:       5/15/2023     8:51 AM   PHQ-2 ( 1999 [employer])   Q1: Little interest or pleasure in doing things 0   Q2: Feeling down, depressed or hopeless 2   PHQ-2 Score 2   Q1: Little interest or pleasure in doing things Not at all   Q2: Feeling down, depressed or hopeless More than half the days   PHQ-2 Score 2       Have you ever done Advance Care Planning? (For example, a Health Directive, POLST, or a discussion with a medical provider or your loved ones about your wishes): No, advance care planning information given to patient to review.  Advanced care planning was discussed at today's visit.    Social History     Tobacco Use     Smoking status: Never     Smokeless tobacco: Never   Vaping Use     Vaping status: Not on file   Substance Use Topics     Alcohol use: No             5/15/2023     8:50 AM   Alcohol Use   Prescreen: >3 drinks/day or >7 drinks/week? Not Applicable       Last PSA: No results found for: PSA    Reviewed orders with  "patient. Reviewed health maintenance and updated orders accordingly - Yes  Lab work is in process  Labs reviewed in EPIC    Reviewed and updated as needed this visit by clinical staff   Tobacco  Allergies  Meds              Reviewed and updated as needed this visit by Provider                     Review of Systems   Constitutional: Negative for chills and fever.   HENT: Negative for congestion, ear pain, hearing loss and sore throat.    Eyes: Negative for pain and visual disturbance.   Respiratory: Negative for cough and shortness of breath.    Cardiovascular: Positive for peripheral edema. Negative for chest pain and palpitations.   Gastrointestinal: Positive for constipation. Negative for abdominal pain, diarrhea, heartburn, hematochezia and nausea.   Genitourinary: Negative for dysuria, frequency, genital sores, hematuria, impotence, penile discharge and urgency.   Musculoskeletal: Positive for arthralgias, joint swelling and myalgias.   Skin: Positive for rash.   Neurological: Negative for dizziness, weakness, headaches and paresthesias.   Psychiatric/Behavioral: Negative for mood changes. The patient is nervous/anxious.          OBJECTIVE:   BP (!) 173/67 (BP Location: Right arm, Patient Position: Sitting, Cuff Size: Adult Large)   Pulse 72   Temp 98.6  F (37  C) (Oral)   Resp 16   Ht 1.765 m (5' 9.5\")   Wt 101.8 kg (224 lb 8 oz)   SpO2 100%   BMI 32.68 kg/m      Physical Exam  GENERAL: healthy, alert and no distress  EYES: Eyes grossly normal to inspection, PERRL and conjunctivae and sclerae normal  HENT: ear canals and TM's normal, nose and mouth without ulcers or lesions  NECK: no adenopathy, no asymmetry, masses, or scars and thyroid normal to palpation  RESP: lungs clear to auscultation - no rales, rhonchi or wheezes  CV: regular rate and rhythm, normal S1 S2, no S3 or S4, no murmur, click or rub, no peripheral edema and peripheral pulses strong  ABDOMEN: soft, nontender, no hepatosplenomegaly, " no masses and bowel sounds normal  RECTAL: normal sphincter tone, no rectal masses, prostate normal size, smooth, nontender without nodules or masses  MS: no gross musculoskeletal defects noted, no edema  SKIN: no suspicious lesions or rashes  NEURO: Normal strength and tone, mentation intact and speech normal  PSYCH: mentation appears normal, affect normal/bright    Diagnostic Test Results:  Labs reviewed in Epic    ASSESSMENT/PLAN:   (Z00.00) Routine general medical examination at a health care facility  (primary encounter diagnosis)  Comment:   Plan: REVIEW OF HEALTH MAINTENANCE PROTOCOL ORDERS,         Comprehensive metabolic panel, Lipid panel         reflex to direct LDL Fasting, CBC with         platelets            (M25.571,  G89.29) Chronic pain of right ankle  Comment: Xr: Tiny chronic avulsion fracture of the medial aspect of the neck of the talus   ref to orthopedic for further evaluation and management  Plan: Orthopedic  Referral, XR Ankle Right         G/E 3 Views            (M25.561,  G89.29) Chronic pain of right knee  Comment: X-ray moderate degenerative changes in the lateral compartment, and very mild degenerative changes in the patellofemoral compartment    Plan: XR Knee Right 3 Views,   Orthopedic          Referral            (R07.89) Anterior chest wall pain  Comment:   Plan: ibuprofen (ADVIL/MOTRIN) 800 MG tablet            (Z11.59) Need for hepatitis C screening test  Comment:   Plan: Hepatitis C Screen Reflex to HCV RNA Quant and         Genotype            (Z12.11) Special screening for malignant neoplasms, colon  Comment: History of multiple tubular adenoma polyp with last colonoscopy in 2021  Plan: Repeat in 3 years    (Z12.5) Screening for prostate cancer  Comment:   Plan: PSA, screen            (Z23) Need for vaccination  Comment:   Plan: HEPATITIS B VACCINE ADULT 3 DOSE IM         (ENGERIX-B/RECOMBIVAX HB), TDAP VACCINE         (Adacel, Boostrix)        Evaluation and  management.      Patient has been advised of split billing requirements and indicates understanding: Yes      COUNSELING:   Reviewed preventive health counseling, as reflected in patient instructions       Regular exercise       Healthy diet/nutrition        He reports that he has never smoked. He has never used smokeless tobacco.            Justyna Brandon MD  [facility]

## 2023-05-24 ENCOUNTER — MYC MEDICAL ADVICE (OUTPATIENT)
Dept: FAMILY MEDICINE | Facility: CLINIC | Age: 53
End: 2023-05-24
Payer: COMMERCIAL

## 2023-05-24 DIAGNOSIS — E78.5 HYPERLIPIDEMIA LDL GOAL <100: Primary | ICD-10-CM

## 2023-05-24 RX ORDER — ATORVASTATIN CALCIUM 40 MG/1
40 TABLET, FILM COATED ORAL DAILY
Qty: 90 TABLET | Refills: 0 | Status: SHIPPED | OUTPATIENT
Start: 2023-05-24 | End: 2023-10-17

## 2023-06-15 ENCOUNTER — PRE VISIT (OUTPATIENT)
Dept: ORTHOPEDICS | Facility: CLINIC | Age: 53
End: 2023-06-15
Payer: COMMERCIAL

## 2023-06-15 NOTE — TELEPHONE ENCOUNTER
DIAGNOSIS: right knee pain/ Justyna Brandon @ [facility]/ x-ray in [facility], no previous surgery/ [facility]   APPOINTMENT DATE: 6/26/23   NOTES STATUS DETAILS   OFFICE NOTE from referring provider Internal  5/15/23, 5/22/19 Justyna Brandon MD      MEDICATION LIST Internal    XRAYS (IMAGES & REPORTS) Internal 5/15/23 XR Right Knee

## 2023-06-26 ENCOUNTER — OFFICE VISIT (OUTPATIENT)
Dept: ORTHOPEDICS | Facility: CLINIC | Age: 53
End: 2023-06-26
Attending: FAMILY MEDICINE
Payer: COMMERCIAL

## 2023-06-26 DIAGNOSIS — M25.561 CHRONIC PAIN OF RIGHT KNEE: ICD-10-CM

## 2023-06-26 DIAGNOSIS — G89.29 CHRONIC PAIN OF RIGHT KNEE: ICD-10-CM

## 2023-06-26 PROCEDURE — 99203 OFFICE O/P NEW LOW 30 MIN: CPT | Performed by: FAMILY MEDICINE

## 2023-06-26 NOTE — PROGRESS NOTES
[facility]  SPORTS & ORTHOPEDIC CLINIC VISIT     Jun 26, 2023        ASSESSMENT & PLAN    52-year-old with right anterior knee pain suspected be patellofemoral likely due to osteoarthritis.    Reviewed imaging and assessment with patient in detail  Discussed options for treatment including:  -use of compression knee sleeve with patellar cut out  -use of acetaminophen or topical diclofenac PRN  -discussed the benefit of physical therapy and regular exercise.  PT referral provided  -discussed indication for steroid injection.   -otherwise follow up prn      Nael Beach MD  [facility] [place]    -----  Chief Complaint   Patient presents with     Right Knee - Pain       SUBJECTIVE  Demario Gordon is a/an 52 year old male who is seen in consultation at the request of  Justyna Brandon M.D. for evaluation of  Right knee pain.     The patient is seen by themselves.  The patient is Right handed    Onset: 2018. Patient describes injury as falling and not having the injury treated at that time.  Location of Pain: right anterior knee   Worsened by: Walking, Flexion of knee,   Better with: Rest  Treatments tried: rest/activity avoidance, ibuprofen prn. Knee sleeve  Associated symptoms: intermittent swelling, some clicking. No locking or catching.     Orthopedic/Surgical history: NO  Social History/Occupation: [employer]       REVIEW OF SYSTEMS:    Do you have fever, chills, weight loss? No    Do you have any vision problems? No    Do you have any chest pain or edema? No    Do you have any shortness of breath or wheezing?  No    Do you have stomach problems? No    Do you have any numbness or focal weakness? No    Do you have diabetes? No    Do you have problems with bleeding or clotting? No    Do you have an rashes or other skin lesions? No    OBJECTIVE:  There were no vitals taken for this visit.     Patient is alert, No acute distress, pleasant and  conversational.    Gait: nonantalgic. Normal heel toe gait.    Patient is able to perform two legged squat without difficulty.    right knee:   Skin intact. No erythema or ecchymosis.  No effusion or soft tissue swelling.    AROM: Zero to approximately 135  with crepitus noted in anterior knee.  Pain on terminal flexion    Palpation: No medial or lateral facet joint tenderness.  Mild TTP over the anterior lateral joint line.  No TTP over the medial joint line    Special Tests:  Negative bounce test, negative forced flexion and negative Ke's.  No ligamentous laxity or pain with valgus or varus stress.  Negative Lachman's, Anterior Drawer and Posterior Drawer     Full Isometric quad strength, extensor mechanism in place     Neurovascularly intact in the lower extremity    Hip and Ankle with full AROM and nontender      RADIOLOGY:    Three-view x-rays left knee performed on 5/15/2023 and reviewed independently demonstrating mild tricompartmental DJD with osteophytosis as well as loose body likely in the posterior knee.  No acute findings.  See EMR for formal radiology report.

## 2023-06-26 NOTE — LETTER
6/26/2023      RE: Demario Gordon  [address] [place] [address]     Dear Colleague,    Thank you for referring your patient, Demario Gordon, to the [facility]. Please see a copy of my visit note below.      [facility]  SPORTS & ORTHOPEDIC CLINIC VISIT     Jun 26, 2023        ASSESSMENT & PLAN    52-year-old with right anterior knee pain suspected be patellofemoral likely due to osteoarthritis.    Reviewed imaging and assessment with patient in detail  Discussed options for treatment including:  -use of compression knee sleeve with patellar cut out  -use of acetaminophen or topical diclofenac PRN  -discussed the benefit of physical therapy and regular exercise.  PT referral provided  -discussed indication for steroid injection.   -otherwise follow up prn      Nael Beach MD  [facility]    -----  Chief Complaint   Patient presents with    Right Knee - Pain       SUBJECTIVE  Demario Gordon is a/an 52 year old male who is seen in consultation at the request of  Justyna Brandon M.D. for evaluation of  Right knee pain.     The patient is seen by themselves.  The patient is Right handed    Onset: 2018. Patient describes injury as falling and not having the injury treated at that time.  Location of Pain: right anterior knee   Worsened by: Walking, Flexion of knee,   Better with: Rest  Treatments tried: rest/activity avoidance, ibuprofen prn. Knee sleeve  Associated symptoms: intermittent swelling, some clicking. No locking or catching.     Orthopedic/Surgical history: NO  Social History/Occupation: [employer]       REVIEW OF SYSTEMS:  Do you have fever, chills, weight loss? No  Do you have any vision problems? No  Do you have any chest pain or edema? No  Do you have any shortness of breath or wheezing?  No  Do you have stomach problems? No  Do you have any numbness or focal weakness? No  Do you have  diabetes? No  Do you have problems with bleeding or clotting? No  Do you have an rashes or other skin lesions? No    OBJECTIVE:  There were no vitals taken for this visit.     Patient is alert, No acute distress, pleasant and conversational.    Gait: nonantalgic. Normal heel toe gait.    Patient is able to perform two legged squat without difficulty.    right knee:   Skin intact. No erythema or ecchymosis.  No effusion or soft tissue swelling.    AROM: Zero to approximately 135  with crepitus noted in anterior knee.  Pain on terminal flexion    Palpation: No medial or lateral facet joint tenderness.  Mild TTP over the anterior lateral joint line.  No TTP over the medial joint line    Special Tests:  Negative bounce test, negative forced flexion and negative Ke's.  No ligamentous laxity or pain with valgus or varus stress.  Negative Lachman's, Anterior Drawer and Posterior Drawer     Full Isometric quad strength, extensor mechanism in place     Neurovascularly intact in the lower extremity    Hip and Ankle with full AROM and nontender      RADIOLOGY:    Three-view x-rays left knee performed on 5/15/2023 and reviewed independently demonstrating mild tricompartmental DJD with osteophytosis as well as loose body likely in the posterior knee.  No acute findings.  See EMR for formal radiology report.          Again, thank you for allowing me to participate in the care of your patient.      Sincerely,    Nael Beach MD

## 2023-06-26 NOTE — LETTER
Patient:  Demario Gordon  :   1970  MRN:     [id number]      2023    To whom it may concern:    Demario Gordon is under my professional care for a right knee injury and may return to work with the following restrictions:     -Work up to a maximum of 40 hours/week    Restrictions to remain in place for 6 weeks.       Please contact our office with questions or concerns.     Sincerely,        Nael Beach MD

## 2023-07-13 NOTE — TELEPHONE ENCOUNTER
DIAGNOSIS: Left ankle pain// self referred // new injury no records or images   APPOINTMENT DATE: 7/17/23   NOTES STATUS DETAILS   MEDICATION LIST Internal

## 2023-07-14 DIAGNOSIS — M25.572 LEFT ANKLE PAIN: Primary | ICD-10-CM

## 2023-07-17 ENCOUNTER — ANCILLARY PROCEDURE (OUTPATIENT)
Dept: GENERAL RADIOLOGY | Facility: CLINIC | Age: 53
End: 2023-07-17
Attending: FAMILY MEDICINE
Payer: COMMERCIAL

## 2023-07-17 ENCOUNTER — PRE VISIT (OUTPATIENT)
Dept: ORTHOPEDICS | Facility: CLINIC | Age: 53
End: 2023-07-17

## 2023-07-17 ENCOUNTER — OFFICE VISIT (OUTPATIENT)
Dept: ORTHOPEDICS | Facility: CLINIC | Age: 53
End: 2023-07-17
Payer: COMMERCIAL

## 2023-07-17 DIAGNOSIS — M79.672 BILATERAL FOOT PAIN: Primary | ICD-10-CM

## 2023-07-17 DIAGNOSIS — M79.671 BILATERAL FOOT PAIN: Primary | ICD-10-CM

## 2023-07-17 PROCEDURE — 99214 OFFICE O/P EST MOD 30 MIN: CPT | Performed by: FAMILY MEDICINE

## 2023-07-17 PROCEDURE — 73610 X-RAY EXAM OF ANKLE: CPT | Mod: LT | Performed by: RADIOLOGY

## 2023-07-17 NOTE — PROGRESS NOTES
[facility]  SPORTS & ORTHOPEDIC CLINIC VISIT     Jul 17, 2023        ASSESSMENT & PLAN    52-year-old with left ankle and foot pain that is likely due to degenerative disease in the ankle.  Has some elements Plantar fasciitis on the left foot    Reviewed imaging and assessment with patient in detail  Provided with exercise handout.  We discussed potential referral to physical therapy.  He was also provided with shoe inserts that he would wear in his regular every day work shoes.  Continue to use topical anti-inflammatory ibuprofen on as-needed basis for pain  May consider steroid injection in the foot/ankle in future.    Nael Beach MD  [facility] SPORTS MEDICINE [facility]    -----  Chief Complaint   Patient presents with     Left Ankle - Pain       SUBJECTIVE  Demario Gordon is a/an 52 year old male who is seen as a self referral for evaluation of  Left ankle pain.     The patient is seen by themselves.  The patient is Right handed    Onset: 6 month(s) ago. Reports insidious onset without acute precipitating event.  Location of Pain: left medial and lateral ankle and into the plantar side of foot   Worsened by: walking, walking for exercise then the next am is painful   Better with: Ointment  Treatments tried: ointment   Associated symptoms: no distal numbness or tingling; denies swelling or warmth    Orthopedic/Surgical history: NO  Social History/Occupation: [employer]       REVIEW OF SYSTEMS:    Do you have fever, chills, weight loss? No    Do you have any vision problems? No    Do you have any chest pain or edema? No    Do you have any shortness of breath or wheezing?  No    Do you have stomach problems? No    Do you have any numbness or focal weakness? No    Do you have diabetes? No    Do you have problems with bleeding or clotting? No    Do you have an rashes or other skin lesions? No    OBJECTIVE:  There were no vitals taken for this visit.     General: Alert,  pleasant, no distress  Left ankle: warm, well perfused, SILT throughout     Inspection: Warm and well-perfused.  No swelling or deformity     ROM: Symmetric without significant pain     Strength: Intact without pain     Palpation: TTP over the tibiotalar joint as well as over the dorsal aspect of talonavicular.  He additionally has some tenderness over the medial aspect of the calcaneus at the plantar fascia attachment site.     Special Tests: Positive wind lass test      RADIOLOGY:    Three-view x-rays of left ankle performed reviewed by me demonstrate no acute fracture or dislocation. 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  See EMR for radiology report.

## 2023-07-17 NOTE — PATIENT INSTRUCTIONS
Plantar Fasciitis Education    WHAT IS PLANTAR FASCIITIS?    Plantar fasciitis is painful irritation of the tissue on the bottom of your foot between the ball of the foot and the heel. This tough tissue that supports the arch of your foot is called fascia.    WHAT IS THE CAUSE?    Plantar fasciitis can be caused by a number of things, like:    A lot of running, jumping, walking, or stair-climbing  Wearing high heels for long periods of time  Gaining weight  If you are a runner, you may get plantar fasciitis when you start running further during each workout or if you run more often. It can also happen if you start running on a different surface or in different terrain, or if your shoes are worn out and don't give enough cushioning for your heels.    If you wear high-heeled shoes, including western-style boots, the fascia can get shorter. You may then have pain when you stretch the fascia. This painful stretching might happen, for example, when you walk barefoot after getting out of bed in the morning.    If you gain weight, you may put more stress on your feet, especially if you walk a lot or  shoes with poor cushioning.    If the arches of your foot are unusually high or low, you are more likely to develop plantar fasciitis than if your arches are normal.    WHAT ARE THE SYMPTOMS?    The main symptom of plantar fasciitis is heel pain when you walk. You may also feel pain when you stand and possibly even when you are resting. This pain typically occurs first thing in the morning when you get out of bed and put your foot flat on the floor, stretching the fascia. The pain may lessen after you have been up for a while and walking, but then the pain may come back after you have been resting.    You may not have any pain when you are sleeping because the position of your feet during rest allows the fascia to shorten and relax.    HOW IS IT DIAGNOSED?    Your healthcare provider will ask about your symptoms,  activities, and medical history and examine your foot and ankle. You may have an X-ray of your heel.    HOW IS IT TREATED?    Give your painful heel lots of rest. You will need to change or stop doing the activities that cause pain until your foot has healed. You may need to stay completely off your foot for several days when the pain is severe.    Your healthcare provider may recommend stretching and strengthening exercises to help you heal. Exercises to make the foot stronger and to stretch the fascia are very important.    Your healthcare provider may recommend shoe inserts called arch supports or orthotics. You can buy them at a pharmacy or athletic shoe store or they can be custom made. Make sure the arch supports are firm. If you can easily bend them in half, they may be too flexible. These supports can be particularly helpful if you have flat feet or high arches. Wearing athletic shoes or heel cushions in both shoes may also help. Cushions are most helpful if you are overweight or an older adult.    A night splint may help keep the plantar fascia stretched while you are sleeping.    Your provider may give you a shot of steroid medicine. Your healthcare provider may have other treatments to suggest.    With treatment, plantar fasciitis may take up to several months to heal. You may find that the pain comes and goes. Talk to your healthcare provider if you do not feel improvement with treatment.    HOW CAN I TAKE CARE OF MYSELF?    To help relieve pain:    Rest your heel on an ice pack, gel pack, or package of frozen vegetables wrapped in a cloth every 3 to 4 hours for up to 20 minutes at a time.  Keep your foot up on a pillow when you sit or lie down.  Take nonprescription pain medicine, such as acetaminophen, ibuprofen, or naproxen. Read the label and take as directed. Nonsteroidal anti-inflammatory medicines (NSAIDs), such as ibuprofen or naproxen, may cause stomach bleeding and other problems. These risks  increase with age. Unless recommended by your healthcare provider, do not take an NSAID for more than 10 days.  Follow your healthcare provider's instructions, including any exercises recommended by your provider. Ask your provider:    How and when you will hear your test results  How long it will take to recover  What activities you should avoid, including how much you can lift, and when you can return to your normal activities  How to take care of yourself at home  What symptoms or problems you should watch for and what to do if you have them  Make sure you know when you should come back for a checkup.    HOW CAN I HELP PREVENT PLANTAR FASCIITIS?    The best way to prevent plantar fasciitis is to wear well-made shoes that fit your feet and give good arch support and cushioning. This is especially important if you exercise or walk a lot or stand for a long time on hard surfaces. Get new athletic shoes before your old shoes stop supporting and cushioning your feet.    You should also:    Avoid repeated jarring to the heel.  Keep a healthy weight.  Do leg and foot stretching exercises regularly.    Developed by "[employer].  Published by "[employer].  Copyright  2014 [employer] and/or one of its subsidiaries. All rights reserved.      Plantar Fasciitis Exercises:    You may start strengthening the muscles of your hip and stretching the muscles of your foot right away.    Prone hip extension: Lie on your stomach with your legs straight out behind you. Fold your arms under your head and rest your head on your arms. Draw your belly button in towards your spine and tighten your abdominal muscles. Tighten the buttocks and thigh muscles of the leg on your injured side and lift the leg off the floor about 8 inches. Keep your leg straight. Hold for 5 seconds. Then lower your leg and relax. Do 2 sets of 15.    Side-lying leg lift: Lie on your uninjured side. Tighten the front thigh muscles on your injured leg and  lift that leg 8 to 10 inches (20 to 25 centimeters) away from the other leg. Keep the leg straight and lower it slowly. Do 2 sets of 15.    *Frozen can roll: Roll your bare injured foot back and forth from your heel to your mid-arch over a frozen juice can. Repeat for 3 to 5 minutes. This exercise is particularly helpful if it is done first thing in the morning.    *Towel stretch: Sit on a hard surface with your injured leg stretched out in front of you. Loop a towel around your toes and the ball of your foot and pull the towel toward your body keeping your leg straight. Hold this position for 15 to 30 seconds and then relax. Repeat 3 times.    *Standing calf stretch: Stand facing a wall with your hands on the wall at about eye level. Keep your injured leg back with your heel on the floor. Keep the other leg forward with the knee bent. Turn your back foot slightly inward (as if you were pigeon-toed). Slowly lean into the wall until you feel a stretch in the back of your calf. Hold the stretch for 15 to 30 seconds. Return to the starting position. Repeat 3 times. Do this exercise several times each day.    *Seated plantar fascia stretch: Sit in a chair and cross the injured foot over the knee of your other leg. Place your fingers over the base of your toes and pull them back toward your shin until you feel a comfortable stretch in the arch of your foot. Hold 15 seconds and repeat 3 times.    *Plantar fascia massage: Sit in a chair and cross the injured foot over the knee of your other leg. Place your fingers over the base of the toes of your injured foot and pull your toes toward your shin until you feel a stretch in the arch of your foot. With your other hand, massage the bottom of your foot, moving from the heel toward your toes. Do this for 3 to 5 minutes. Start gently. Press harder on the bottom of your foot as you become able to tolerate more pressure.    Achilles stretch: Stand with the ball of one foot on a  stair. Reach for the step below with your heel until you feel a stretch in the arch of your foot. Hold this position for 15 to 30 seconds and then relax. Repeat 3 times.  After you have stretched the bottom muscles of your foot, you can do these exercises to start strengthening the muscles of your foot.    Towel pickup: With your heel on the ground,  a towel with your toes. Release. Repeat 10 to 20 times. When this gets easy, add more resistance by placing a book or small weight on the towel.    Balance and reach exercises: Stand next to a chair with your injured leg farther from the chair. The chair will provide support if you need it. Stand on the foot of your injured leg and bend your knee slightly. Try to raise the arch of this foot while keeping your big toe on the floor. Keep your foot in this position.  With the hand that is farther away from the chair, reach forward in front of you by bending at the waist. Avoid bending your knee any more as you do this. Repeat this 15 times. To make the exercise more challenging, reach farther in front of you. Do 2 sets of 15.    While keeping your arch raised, reach the hand that is farther away from the chair across your body toward the chair. The farther you reach, the more challenging the exercise. Do 2 sets of 15.    Heel raise: Stand behind a chair or counter with both feet flat on the floor. Using the chair or counter as a support, rise up onto your toes and hold for 5 seconds. Then slowly lower yourself down without holding onto the support. (It's OK to keep holding onto the support if you need to.) When this exercise becomes less painful, try doing this exercise while you are standing on the injured leg only. Repeat 15 times. Do 2 sets of 15. Rest 30 seconds between sets.    Developed by [employer].  Published by [employer].  Copyright  2014 [employer] and/or one of its subsidiaries. All rights reserved.

## 2023-07-17 NOTE — LETTER
7/17/2023      RE: Demario Gordon  [address] [place] [address]     Dear Colleague,    Thank you for referring your patient, Demario Gordon, to the [facility] SPORTS MEDICINE [facility]. Please see a copy of my visit note below.      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  Has some elements Plantar fasciitis on the left foot    Reviewed imaging and assessment with patient in detail  Provided with exercise handout.  We discussed potential referral to physical therapy.  He was also provided with shoe inserts that he would wear in his regular every day work shoes.  Continue to use topical anti-inflammatory ibuprofen on as-needed basis for pain  May consider steroid injection in the foot/ankle in future.    Nael Beach MD  [facility] MEDICINE [facility]    -----  Chief Complaint   Patient presents with    Left Ankle - Pain       SUBJECTIVE  Demario Gordon is a/an 52 year old male who is seen as a self referral for evaluation of  Left ankle pain.     The patient is seen by themselves.  The patient is Right handed    Onset: 6 month(s) ago. Reports insidious onset without acute precipitating event.  Location of Pain: left medial and lateral ankle and into the plantar side of foot   Worsened by: walking, walking for exercise then the next am is painful   Better with: Ointment  Treatments tried: ointment   Associated symptoms: no distal numbness or tingling; denies swelling or warmth    Orthopedic/Surgical history: NO  Social History/Occupation: [employer]       REVIEW OF SYSTEMS:  Do you have fever, chills, weight loss? No  Do you have any vision problems? No  Do you have any chest pain or edema? No  Do you have any shortness of breath or wheezing?  No  Do you have stomach problems? No  Do you have any  numbness or focal weakness? No  Do you have diabetes? No  Do you have problems with bleeding or clotting? No  Do you have an rashes or other skin lesions? No    OBJECTIVE:  There were no vitals taken for this visit.     General: Alert, pleasant, no distress  Left ankle: warm, well perfused, SILT throughout     Inspection: Warm and well-perfused.  No swelling or deformity     ROM: Symmetric without significant pain     Strength: Intact without pain     Palpation: TTP over the tibiotalar joint as well as over the dorsal aspect of talonavicular.  He additionally has some tenderness over the medial aspect of the calcaneus at the plantar fascia attachment site.     Special Tests: Positive wind lass test      RADIOLOGY:    Three-view x-rays of left ankle performed reviewed by me demonstrate no acute fracture or dislocation. 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  See EMR for radiology report.          Again, thank you for allowing me to participate in the care of your patient.      Sincerely,    Nael Beach MD

## 2023-08-04 ENCOUNTER — NURSE TRIAGE (OUTPATIENT)
Dept: NURSING | Facility: CLINIC | Age: 53
End: 2023-08-04

## 2023-08-04 ENCOUNTER — OFFICE VISIT (OUTPATIENT)
Dept: FAMILY MEDICINE | Facility: CLINIC | Age: 53
End: 2023-08-04
Payer: COMMERCIAL

## 2023-08-04 VITALS
WEIGHT: 223 LBS | DIASTOLIC BLOOD PRESSURE: 74 MMHG | OXYGEN SATURATION: 98 % | HEART RATE: 68 BPM | SYSTOLIC BLOOD PRESSURE: 135 MMHG | TEMPERATURE: 97.9 F | RESPIRATION RATE: 17 BRPM | BODY MASS INDEX: 32.46 KG/M2

## 2023-08-04 DIAGNOSIS — E78.5 DYSLIPIDEMIA: ICD-10-CM

## 2023-08-04 DIAGNOSIS — L30.9 DERMATITIS: Primary | ICD-10-CM

## 2023-08-04 DIAGNOSIS — R10.9 FLANK PAIN: ICD-10-CM

## 2023-08-04 DIAGNOSIS — S39.012A STRAIN OF LUMBAR REGION, INITIAL ENCOUNTER: ICD-10-CM

## 2023-08-04 LAB
ALBUMIN UR-MCNC: NEGATIVE MG/DL
APPEARANCE UR: CLEAR
BACTERIA #/AREA URNS HPF: ABNORMAL /HPF
BILIRUB UR QL STRIP: NEGATIVE
CHOLEST SERPL-MCNC: 120 MG/DL
COLOR UR AUTO: YELLOW
CREAT SERPL-MCNC: 0.83 MG/DL (ref 0.67–1.17)
GFR SERPL CREATININE-BSD FRML MDRD: >90 ML/MIN/1.73M2
GLUCOSE UR STRIP-MCNC: NEGATIVE MG/DL
HBA1C MFR BLD: 5.1 % (ref 0–5.6)
HDLC SERPL-MCNC: 45 MG/DL
HGB UR QL STRIP: NEGATIVE
KETONES UR STRIP-MCNC: NEGATIVE MG/DL
LDLC SERPL CALC-MCNC: 54 MG/DL
LEUKOCYTE ESTERASE UR QL STRIP: NEGATIVE
MUCOUS THREADS #/AREA URNS LPF: PRESENT /LPF
NITRATE UR QL: NEGATIVE
NONHDLC SERPL-MCNC: 75 MG/DL
PH UR STRIP: 8 [PH] (ref 5–8)
RBC #/AREA URNS AUTO: ABNORMAL /HPF
SP GR UR STRIP: 1.01 (ref 1–1.03)
SQUAMOUS #/AREA URNS AUTO: ABNORMAL /LPF
TRIGL SERPL-MCNC: 105 MG/DL
UROBILINOGEN UR STRIP-ACNC: 0.2 E.U./DL
WBC #/AREA URNS AUTO: ABNORMAL /HPF
WBC CLUMPS #/AREA URNS HPF: PRESENT /HPF

## 2023-08-04 PROCEDURE — 80061 LIPID PANEL: CPT | Performed by: STUDENT IN AN ORGANIZED HEALTH CARE EDUCATION/TRAINING PROGRAM

## 2023-08-04 PROCEDURE — 99214 OFFICE O/P EST MOD 30 MIN: CPT | Performed by: STUDENT IN AN ORGANIZED HEALTH CARE EDUCATION/TRAINING PROGRAM

## 2023-08-04 PROCEDURE — 83036 HEMOGLOBIN GLYCOSYLATED A1C: CPT | Performed by: STUDENT IN AN ORGANIZED HEALTH CARE EDUCATION/TRAINING PROGRAM

## 2023-08-04 PROCEDURE — 81001 URINALYSIS AUTO W/SCOPE: CPT | Performed by: STUDENT IN AN ORGANIZED HEALTH CARE EDUCATION/TRAINING PROGRAM

## 2023-08-04 PROCEDURE — 82565 ASSAY OF CREATININE: CPT | Performed by: STUDENT IN AN ORGANIZED HEALTH CARE EDUCATION/TRAINING PROGRAM

## 2023-08-04 PROCEDURE — 36415 COLL VENOUS BLD VENIPUNCTURE: CPT | Performed by: STUDENT IN AN ORGANIZED HEALTH CARE EDUCATION/TRAINING PROGRAM

## 2023-08-04 RX ORDER — TRIAMCINOLONE ACETONIDE 1 MG/G
OINTMENT TOPICAL 2 TIMES DAILY
Qty: 30 G | Refills: 0 | Status: SHIPPED | OUTPATIENT
Start: 2023-08-04 | End: 2024-06-06

## 2023-08-04 NOTE — PATIENT INSTRUCTIONS
- heat/ice  - tylenol 500 - 1000 mg four times daily as needed or ibuprofen 400 mg three times daily as needed  - back exercises  - if no improvement, follow up with primary care doctor and consider physical therapy

## 2023-08-04 NOTE — PROGRESS NOTES
Assessment & Plan     Strain of lumbar region, initial encounter  Flank pain  52-year-old man with history of dyslipidemia who presents with 3 weeks of right flank pain likely musculoskeletal in etiology.  No red flags including fever, trauma, fall, focal neurologic deficits.  Vitals are within normal limits.  On exam, the patient has right thoracic and lumbar paraspinal tenderness to palpation and with lumbar rotation.  Urine analysis obtained without evidence of urinary tract infection or hematuria to suggest nephrolithiasis.  Plan: Conservative management for lumbar strain with ice/heat, Tylenol or ibuprofen as needed, Voltaren gel as needed, and back exercises.  Follow-up with PCP within 4 weeks if no improvement and consider physical therapy.  The patient's questions were addressed and he verbalized understanding.  - UA with Microscopic reflex to Culture - Clinic Collect  - Creatinine  - Creatinine  - UA Microscopic with Reflex to Culture  - diclofenac (VOLTAREN) 1 % topical gel  Dispense: 150 g; Refill: 1    Dermatitis  - triamcinolone (KENALOG) 0.1 % external ointment  Dispense: 30 g; Refill: 0    Dyslipidemia  Follow-up with PCP for chronic medical conditions including dyslipidemia.  A1c is 5.1.  - Lipid panel reflex to direct LDL Fasting  - Hemoglobin A1c  - Lipid panel reflex to direct LDL Fasting  - Hemoglobin A1c             No follow-ups on file.    Margarita Leslie MD  [facility]    Adolfo Hanks is a 52 year old male who presents to clinic today for the following health issues:  Chief Complaint   Patient presents with    Back Pain     Sharp pain -on off for 3 weeks      HPI    Back Pain    Onset of symptoms was 3 week(s) ago.  2-3 months ago, he had similar symptoms, he took ibuprofen/tylenol with resolution.  Location: right middle of back  Radiation: does not radiate  Context: no fall or injury  Course of symptoms is same.    Severity: 6-7/10  Current and  Associated symptoms: pain  Denies: fecal incontinence, urinary incontinence, lower extremity numbness, lower extremity weakness, and paresthesia  Aggravating Factors: standing and bending  Therapies to improve symptoms include: ibuprofen  Past history: no prior back problems    The patient wants to have his cholesterol checked. He is on lipitor and does not have a primary care doctor currently.    Review of Systems  Constitutional, HEENT, cardiovascular, pulmonary, gi and gu systems are negative, except as otherwise noted.      Objective    /74   Pulse 68   Temp 97.9  F (36.6  C)   Resp 17   Wt 101.2 kg (223 lb)   SpO2 98%   BMI 32.46 kg/m    Physical Exam   GENERAL: healthy, alert and no distress  EYES: Eyes grossly normal to inspection, and conjunctivae and sclerae normal  RESP: lungs clear to auscultation - no rales, rhonchi or wheezes  CV: regular rate and rhythm, normal S1 S2, no S3 or S4, no murmur, normal cap refill, peripheral pulses strong  ABDOMEN: soft, nontender, no hepatosplenomegaly, no CVA tenderness  SKIN: Cluster of 1 mm macules located on the trunk without erythema, tenderness  MSK: Has tenderness of the right lower thoracic and lumbar paraspinal muscles, negative straight leg raise  NEURO: Normal range of motion of the back with some pain on the right paraspinal muscle region with rotation, normal strength and sensation to light touch in bilateral lower extremities    Results for orders placed or performed in visit on 08/04/23 (from the past 24 hour(s))   UA with Microscopic reflex to Culture - Clinic Collect    Specimen: Urine, Clean Catch   Result Value Ref Range    Color Urine Yellow Colorless, Straw, Light Yellow, Yellow    Appearance Urine Clear Clear    Glucose Urine Negative Negative mg/dL    Bilirubin Urine Negative Negative    Ketones Urine Negative Negative mg/dL    Specific Gravity Urine 1.015 1.005 - 1.030    Blood Urine Negative Negative    pH Urine 8.0 5.0 - 8.0     Protein Albumin Urine Negative Negative mg/dL    Urobilinogen Urine 0.2 0.2, 1.0 E.U./dL    Nitrite Urine Negative Negative    Leukocyte Esterase Urine Negative Negative   UA Microscopic with Reflex to Culture   Result Value Ref Range    Bacteria Urine None Seen None Seen /HPF    RBC Urine None Seen 0-2 /HPF /HPF    WBC Urine 0-5 0-5 /HPF /HPF    Squamous Epithelials Urine Few (A) None Seen /LPF    WBC Clumps Urine Present (A) None Seen /HPF    Mucus Urine Present (A) None Seen /LPF    Narrative    Urine Culture not indicated   Hemoglobin A1c   Result Value Ref Range    Hemoglobin A1C 5.1 0.0 - 5.6 %

## 2023-08-04 NOTE — TELEPHONE ENCOUNTER
"Pharmacy calling needed a location for the triamcinolone (KENALOG) 0.1 % external ointment per office visit notes. \"SKIN: Cluster of 1 mm macules located on the trunk without erythema, tenderness \".  Information provided.    Radhika Ng RN on 8/4/2023 at 3:03 PM    Reason for Disposition    Pharmacy calling with prescription question and triager answers question    Protocols used: Medication Question Call-A-[place]    "

## 2023-08-08 ENCOUNTER — THERAPY VISIT (OUTPATIENT)
Dept: PHYSICAL THERAPY | Facility: REHABILITATION | Age: 53
End: 2023-08-08
Attending: FAMILY MEDICINE
Payer: COMMERCIAL

## 2023-08-08 DIAGNOSIS — M25.561 CHRONIC PAIN OF RIGHT KNEE: ICD-10-CM

## 2023-08-08 DIAGNOSIS — G89.29 CHRONIC PAIN OF RIGHT KNEE: ICD-10-CM

## 2023-08-08 PROCEDURE — 97161 PT EVAL LOW COMPLEX 20 MIN: CPT | Mod: GP | Performed by: PHYSICAL THERAPIST

## 2023-08-08 PROCEDURE — 97110 THERAPEUTIC EXERCISES: CPT | Mod: GP | Performed by: PHYSICAL THERAPIST

## 2023-08-08 ASSESSMENT — ACTIVITIES OF DAILY LIVING (ADL)
RISE FROM A CHAIR: ACTIVITY IS VERY DIFFICULT
HOW_WOULD_YOU_RATE_THE_OVERALL_FUNCTION_OF_YOUR_KNEE_DURING_YOUR_USUAL_DAILY_ACTIVITIES?: SEVERELY ABNORMAL
KNEEL ON THE FRONT OF YOUR KNEE: I AM UNABLE TO DO THE ACTIVITY
STAND: ACTIVITY IS FAIRLY DIFFICULT
SWELLING: THE SYMPTOM AFFECTS MY ACTIVITY MODERATELY
KNEE_ACTIVITY_OF_DAILY_LIVING_SUM: 17
WEAKNESS: THE SYMPTOM AFFECTS MY ACTIVITY MODERATELY
GIVING WAY, BUCKLING OR SHIFTING OF KNEE: THE SYMPTOM AFFECTS MY ACTIVITY SEVERELY
RAW_SCORE: 17
AS_A_RESULT_OF_YOUR_KNEE_INJURY,_HOW_WOULD_YOU_RATE_YOUR_CURRENT_LEVEL_OF_DAILY_ACTIVITY?: ABNORMAL
PAIN: THE SYMPTOM AFFECTS MY ACTIVITY SEVERELY
LIMPING: THE SYMPTOM AFFECTS MY ACTIVITY MODERATELY
SQUAT: ACTIVITY IS VERY DIFFICULT
GO DOWN STAIRS: ACTIVITY IS VERY DIFFICULT
GO UP STAIRS: ACTIVITY IS VERY DIFFICULT
WALK: ACTIVITY IS VERY DIFFICULT
STIFFNESS: THE SYMPTOM AFFECTS MY ACTIVITY SEVERELY
KNEE_ACTIVITY_OF_DAILY_LIVING_SCORE: 24.29
SIT WITH YOUR KNEE BENT: ACTIVITY IS VERY DIFFICULT
HOW_WOULD_YOU_RATE_THE_CURRENT_FUNCTION_OF_YOUR_KNEE_DURING_YOUR_USUAL_DAILY_ACTIVITIES_ON_A_SCALE_FROM_0_TO_100_WITH_100_BEING_YOUR_LEVEL_OF_KNEE_FUNCTION_PRIOR_TO_YOUR_INJURY_AND_0_BEING_THE_INABILITY_TO_PERFORM_ANY_OF_YOUR_USUAL_DAILY_ACTIVITIES?: 20
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
## 2023-08-23 ENCOUNTER — THERAPY VISIT (OUTPATIENT)
Dept: PHYSICAL THERAPY | Facility: REHABILITATION | Age: 53
End: 2023-08-23
Payer: COMMERCIAL

## 2023-08-23 DIAGNOSIS — G89.29 CHRONIC PAIN OF RIGHT KNEE: Primary | ICD-10-CM

## 2023-08-23 DIAGNOSIS — M25.561 CHRONIC PAIN OF RIGHT KNEE: Primary | ICD-10-CM

## 2023-08-23 PROCEDURE — 97112 NEUROMUSCULAR REEDUCATION: CPT | Mod: GP | Performed by: PHYSICAL THERAPIST

## 2023-08-23 PROCEDURE — 97110 THERAPEUTIC EXERCISES: CPT | Mod: GP | Performed by: PHYSICAL THERAPIST

## 2023-08-31 ENCOUNTER — THERAPY VISIT (OUTPATIENT)
Dept: PHYSICAL THERAPY | Facility: REHABILITATION | Age: 53
End: 2023-08-31
Payer: COMMERCIAL

## 2023-08-31 DIAGNOSIS — M25.561 CHRONIC PAIN OF RIGHT KNEE: Primary | ICD-10-CM

## 2023-08-31 DIAGNOSIS — G89.29 CHRONIC PAIN OF RIGHT KNEE: Primary | ICD-10-CM

## 2023-08-31 PROCEDURE — 97110 THERAPEUTIC EXERCISES: CPT | Mod: GP

## 2023-08-31 PROCEDURE — 97112 NEUROMUSCULAR REEDUCATION: CPT | Mod: GP

## 2023-09-08 ENCOUNTER — THERAPY VISIT (OUTPATIENT)
Dept: PHYSICAL THERAPY | Facility: REHABILITATION | Age: 53
End: 2023-09-08
Payer: COMMERCIAL

## 2023-09-08 DIAGNOSIS — M25.561 CHRONIC PAIN OF RIGHT KNEE: Primary | ICD-10-CM

## 2023-09-08 DIAGNOSIS — G89.29 CHRONIC PAIN OF RIGHT KNEE: Primary | ICD-10-CM

## 2023-09-08 PROCEDURE — 97112 NEUROMUSCULAR REEDUCATION: CPT | Mod: GP | Performed by: PHYSICAL THERAPIST

## 2023-09-08 PROCEDURE — 97110 THERAPEUTIC EXERCISES: CPT | Mod: GP | Performed by: PHYSICAL THERAPIST

## 2023-09-13 ENCOUNTER — THERAPY VISIT (OUTPATIENT)
Dept: PHYSICAL THERAPY | Facility: REHABILITATION | Age: 53
End: 2023-09-13
Payer: COMMERCIAL

## 2023-09-13 DIAGNOSIS — G89.29 CHRONIC PAIN OF RIGHT KNEE: Primary | ICD-10-CM

## 2023-09-13 DIAGNOSIS — M25.561 CHRONIC PAIN OF RIGHT KNEE: Primary | ICD-10-CM

## 2023-09-13 PROCEDURE — 97110 THERAPEUTIC EXERCISES: CPT | Mod: GP | Performed by: PHYSICAL THERAPIST

## 2023-09-13 PROCEDURE — 97112 NEUROMUSCULAR REEDUCATION: CPT | Mod: GP | Performed by: PHYSICAL THERAPIST

## 2023-09-18 ENCOUNTER — THERAPY VISIT (OUTPATIENT)
Dept: PHYSICAL THERAPY | Facility: REHABILITATION | Age: 53
End: 2023-09-18
Payer: COMMERCIAL

## 2023-09-18 DIAGNOSIS — G89.29 CHRONIC PAIN OF RIGHT KNEE: Primary | ICD-10-CM

## 2023-09-18 DIAGNOSIS — M25.561 CHRONIC PAIN OF RIGHT KNEE: Primary | ICD-10-CM

## 2023-09-18 PROCEDURE — 97110 THERAPEUTIC EXERCISES: CPT | Mod: GP | Performed by: PHYSICAL THERAPIST

## 2023-09-18 PROCEDURE — 97112 NEUROMUSCULAR REEDUCATION: CPT | Mod: GP | Performed by: PHYSICAL THERAPIST

## 2023-09-26 ENCOUNTER — THERAPY VISIT (OUTPATIENT)
Dept: PHYSICAL THERAPY | Facility: REHABILITATION | Age: 53
End: 2023-09-26
Payer: COMMERCIAL

## 2023-09-26 DIAGNOSIS — G89.29 CHRONIC PAIN OF RIGHT KNEE: Primary | ICD-10-CM

## 2023-09-26 DIAGNOSIS — M25.561 CHRONIC PAIN OF RIGHT KNEE: Primary | ICD-10-CM

## 2023-09-26 PROCEDURE — 97110 THERAPEUTIC EXERCISES: CPT | Mod: GP | Performed by: PHYSICAL THERAPIST

## 2023-10-02 ENCOUNTER — THERAPY VISIT (OUTPATIENT)
Dept: PHYSICAL THERAPY | Facility: REHABILITATION | Age: 53
End: 2023-10-02
Payer: COMMERCIAL

## 2023-10-02 DIAGNOSIS — M25.561 CHRONIC PAIN OF RIGHT KNEE: Primary | ICD-10-CM

## 2023-10-02 DIAGNOSIS — G89.29 CHRONIC PAIN OF RIGHT KNEE: Primary | ICD-10-CM

## 2023-10-02 PROCEDURE — 97110 THERAPEUTIC EXERCISES: CPT | Mod: GP | Performed by: PHYSICAL THERAPIST

## 2023-10-02 NOTE — PROGRESS NOTES
[facility]                                                                                   OUTPATIENT PHYSICAL THERAPY    PLAN OF TREATMENT FOR OUTPATIENT REHABILITATION   Patient's Last Name, First Name, Demario Lerma YOB: 1970   Provider's Name   [facility]   Medical Record No.  [id number]     Onset Date: 09/08/22  Start of Care Date: 08/08/23     Medical Diagnosis:  Chronic pain of right knee      PT Treatment Diagnosis:  Chronic right knee pain with mobility and strength deficits Plan of Treatment  Frequency/Duration: 1x/week/ 60 days    Certification date from (P) 10/07/23 (First certification started on 8/8/2023 and went to 10/7/2023.) to (P) 12/06/23         See note for plan of treatment details and functional goals     Negar Mojica PT                         I CERTIFY THE NEED FOR THESE SERVICES FURNISHED UNDER        THIS PLAN OF TREATMENT AND WHILE UNDER MY CARE     (Physician attestation of this document indicates review and certification of the therapy plan).                Referring Provider:  Nael Beach      Initial Assessment  See Epic Evaluation- Start of Care Date: 08/08/23       10/02/23 0500   Appointment Info   Signing clinician's name / credentials Negar Mojica PT   Visits Used 8   Medical Diagnosis Chronic pain of right knee   PT Tx Diagnosis Chronic right knee pain with mobility and strength deficits   Quick Adds Certification   Progress Note/Certification   Start of Care Date 08/08/23   Onset of illness/injury or Date of Surgery 09/08/22   Therapy Frequency 1x/week   Predicted Duration 60 days   Certification date from 10/07/23  (First certification started on 8/8/2023 and went to 10/7/2023.)   Certification date to 12/06/23   Progress Note Due Date 09/07/23   Progress Note Completed Date 08/08/23   GOALS   PT Goals 2;3;4   PT Goal 1   Goal Identifier HEP   Goal Description In 30 days, pt  will demonstrate understanding of and independence with his HEP   Goal Progress Initial   Target Date 09/07/23   PT Goal 2   Goal Identifier Knee outcome survey   Goal Description In 60 days, pt will improve KOOS score from 14 points to at least 8 points to indicate improved LE functional mobility   Goal Progress Initial   Target Date 10/07/23   PT Goal 3   Goal Identifier Walking   Goal Description In 60 days, pt will be able to walk at least 1 mile with right knee pain <2/10 for improved community ambulation.   Goal Progress Unable   Target Date 10/07/23   PT Goal 4   Goal Identifier Squatting/ bending over/ sitting on floor   Goal Description In 60 days, pt will be able to perform a squat/ bend over/ and sit on the floor with right knee pain <2/10 in order to play with his kids.   Goal Progress Unable   Target Date 10/07/23   Subjective Report   Subjective Report Demario notes the week went okay without the KT. He was a little sore after our last session, but his knee felt better by the next day. Still has continued anterior knee pain, but can tell it's improving.   Treatment Interventions (PT)   Interventions Therapeutic Procedure/Exercise;Neuromuscular Re-education   Therapeutic Procedure/Exercise   Therapeutic Procedures: strength, endurance, ROM, flexibillity minutes (28326) 39   Therapeutic Procedures Ther Proc 2;Ther Proc 3;Ther Proc 4;Ther Proc 5;Ther Proc 6;Ther Proc 7   Ther Proc 1 Bike: x5 min, WL 6   Ther Proc 1 - Details Treadmill walking: not today   Ther Proc 2 Single leg bridge: x20 maeve   Ther Proc 2 - Details Sidelying hip abduction: R 2 x20 with 2.5 lb ankle weight   Ther Proc 3 BW squats: x20, Narrow stance squat: x20 (minimally incr pain)   Ther Proc 3 - Details Not today- Heel slides (for ROM): Rx10, verbal review for HEP   Ther Proc 4 Seated LAQ: R 2 x20, 2-3 sec holds with 2.5 lb ankle weight   Ther Proc 4 - Details Not today- Seated H/S curl: red Rx10 - HEP   Ther Proc 5 SLR: R 2 x20 with  "2.5 lb ankle weight   Ther Proc 5 - Details NT- Resisted sidestepping: 6 x20 ft, blue theraband above knees   Ther Proc 6 Hamstring stool scoots: not today   Ther Proc 6 - Details Standing quad stretch: R 2 x30 sec   Ther Proc 7 Seated hamstring stretch: 2 x30 sec bilaterally   Patient Response/Progress Added second set to SLR hip flexion & hip abduction, and single leg bridge. Felt sore on right lateral hip afterwards.   Ther Proc 7 - Details Split stance squats: trialed x5 maeve, incr pain with R foot behind   Neuromuscular Re-education   Neuro Re-ed 1 kinesiotape to R knee   Neuro Re-ed 1 - Details 3\" tape x3.5 blocks in a deep Y - 2 strips: places from inf to patella round knee to superior patella; 2nd strip reverse of that (superior to inferior) - placing with 50% stretch moving knee from ext to flex in supine   Skilled Intervention Not today   Education   Learner/Method Patient   Plan   Home program See PTRx   Plan for next session Progress quad and hip strength, any manual therapy as needed, continue with KT as needed   Comments   Comments Assessment: Patient is a 52 year old male with right knee pain since 2018 after falling into a hole and hitting his right knee. Today in therapy we progressed hip and quad strengthening all of which pt tolerated well. He noted general soreness throughout the session, notably in his hip abductors but did not exhibit increased knee pain. Only mild pain with BW squats and when trialing split stance squats on the right. He did demonstrate decreased stability with right foot behind along with pain during split stance squats so will hold that exercise for now. Will continue to progress exercises as tolerated. Demario continues to benefit from skilled therapy.   Total Session Time   Timed Code Treatment Minutes 39   Total Treatment Time (sum of timed and untimed services) 39               "

## 2023-10-10 ENCOUNTER — THERAPY VISIT (OUTPATIENT)
Dept: PHYSICAL THERAPY | Facility: REHABILITATION | Age: 53
End: 2023-10-10
Payer: COMMERCIAL

## 2023-10-10 DIAGNOSIS — G89.29 CHRONIC PAIN OF RIGHT KNEE: Primary | ICD-10-CM

## 2023-10-10 DIAGNOSIS — M25.561 CHRONIC PAIN OF RIGHT KNEE: Primary | ICD-10-CM

## 2023-10-10 PROCEDURE — 97112 NEUROMUSCULAR REEDUCATION: CPT | Mod: GP | Performed by: PHYSICAL THERAPIST

## 2023-10-10 PROCEDURE — 97110 THERAPEUTIC EXERCISES: CPT | Mod: GP | Performed by: PHYSICAL THERAPIST

## 2023-10-10 NOTE — PROGRESS NOTES
PLAN  Continue therapy per current plan of care.  Hold chart open for 30 days.     Beginning/End Dates of Progress Note Reporting Period:  08/08/23 to 10/10/2023    Referring Provider:  Nael Beach       10/10/23 0500   Appointment Info   Signing clinician's name / credentials Negar Mojica PT   Visits Used 9   Medical Diagnosis Chronic pain of right knee   PT Tx Diagnosis Chronic right knee pain with mobility and strength deficits   Quick Adds Certification   Progress Note/Certification   Start of Care Date 08/08/23   Onset of illness/injury or Date of Surgery 09/08/22   Therapy Frequency 1x/week   Predicted Duration 60 days   Certification date from 10/07/23  (First certification started on 8/8/2023 and went to 10/7/2023.)   Certification date to 12/06/23   Progress Note Due Date 09/07/23   Progress Note Completed Date 08/08/23   GOALS   PT Goals 2;3;4   PT Goal 1   Goal Identifier HEP   Goal Description In 30 days, pt will demonstrate understanding of and independence with his HEP   Goal Progress MET   Target Date 09/07/23   Date Met 10/10/23   PT Goal 2   Goal Identifier Knee outcome survey   Goal Description In 60 days, pt will improve KOOS score from 14 points to at least 8 points to indicate improved LE functional mobility   Goal Progress Ongoing   Target Date 10/07/23   PT Goal 3   Goal Identifier Walking   Goal Description In 60 days, pt will be able to walk at least 1 mile with right knee pain <2/10 for improved community ambulation.   Goal Progress Hasn't walked this far yet. But does have pain with walking in general anymore for the distances he needs to walk.   Target Date 10/07/23   PT Goal 4   Goal Identifier Squatting/ bending over/ sitting on floor   Goal Description In 60 days, pt will be able to perform a squat/ bend over/ and sit on the floor with right knee pain <2/10 in order to play with his kids.   Goal Progress MET, able to do this without pain   Target Date 10/07/23   Date Met  "10/10/23   Subjective Report   Subjective Report Demario notes his knee was pretty sore for a few days after our last session. It's feeling better now, still persisting pain on anterior aspect of knee.   Treatment Interventions (PT)   Interventions Therapeutic Procedure/Exercise;Neuromuscular Re-education   Therapeutic Procedure/Exercise   Therapeutic Procedures: strength, endurance, ROM, flexibillity minutes (81653) 33   Therapeutic Procedures Ther Proc 2;Ther Proc 3;Ther Proc 4;Ther Proc 5;Ther Proc 6;Ther Proc 7   Ther Proc 1 Bike: x5 min, WL 6   Ther Proc 1 - Details Treadmill walking: not today   Ther Proc 2 Single leg bridge: x20 maeve; Bridge + hamstring curl on physioball 2 x10   Ther Proc 2 - Details Sidelying hip abduction: R x20 with 2.5 lb ankle weight   Ther Proc 3 BW squats: x20, TRX split stance squat: x8; TRX single leg mini squat R x6 (incr pressure on knee)   Ther Proc 3 - Details Not today- Heel slides (for ROM): Rx10, verbal review for HEP   Ther Proc 4 Seated LAQ: R 1 x20, 2-3 sec holds with 2.5 lb ankle weight   Ther Proc 4 - Details Not today- Seated H/S curl: red Rx10 - HEP   Ther Proc 5 SLR: not today   Ther Proc 5 - Details NT- Resisted sidestepping: 6 x20 ft, blue theraband above knees   Ther Proc 6 Hamstring stool scoots: not today   Ther Proc 6 - Details Standing quad stretch: 2 x30 sec maeve (using stretch strap to assist)   Ther Proc 7 Standing hamstring stretch: 2 x30 sec maeve   Skilled Intervention Quad and hamstring stregthening exercises for improved knee strength, stretches provided to improve LE flexibility   Patient Response/Progress Decreased number of sets performed today due to pt reports of incr soreness for several days after our last session   Neuromuscular Re-education   Neuromuscular re-ed of mvmt, balance, coord, kinesthetic sense, posture, proprioception minutes (65776) 10   Neuro Re-ed 1 kinesiotape to R knee   Neuro Re-ed 1 - Details 3\" tape x3.5 blocks in a deep Y - 2 " strips: places from inf to patella round knee to superior patella; 2nd strip reverse of that (superior to inferior) - placing with 50% stretch moving knee from ext to flex in supine   Skilled Intervention Exercises to improve stability of right knee   Neuro Re-ed 2 SLS: R x30 sec firm surface; R x60 sec on foam pad   Neuro Re-ed 2 - Details SLS + forward hip hinge (airplane pose): R x10, heavy cueing for form   Neuromuscular Re-education Neuro Re-ed 2;Neuro Re-ed 3   Education   Learner/Method Patient   Plan   Home program See PTRx   Plan for next session Progress quad and hip strength, any manual therapy as needed, continue with KT as needed   Comments   Comments Assessment: Patient is a 52 year old male with right knee pain since 2018 after falling into a hole and hitting his right knee. Overall he has demonstrated improvements from SOC, indicated by his ability to perform deep squats without R knee pain. He is also able to sit on the floor and play with his children with no knee pain. He does have persisting anterior medial right knee pain, but the intensity and frequency has significantly decreased since SOC. Pt would like to try his HEP on his own for awhile. Discussed keeping his chart open for 30 days if he feels he would like to come back. Otherwise if he wants to return later on down the road he can reach out to his MD for another PT referral. Pt agrees with POC.   Total Session Time   Timed Code Treatment Minutes 43   Total Treatment Time (sum of timed and untimed services) 43

## 2023-10-17 DIAGNOSIS — E78.5 HYPERLIPIDEMIA LDL GOAL <100: ICD-10-CM

## 2023-10-18 RX ORDER — ATORVASTATIN CALCIUM 40 MG/1
40 TABLET, FILM COATED ORAL DAILY
Qty: 90 TABLET | Refills: 1 | Status: SHIPPED | OUTPATIENT
Start: 2023-10-18 | End: 2024-06-06

## 2023-10-23 ENCOUNTER — OFFICE VISIT (OUTPATIENT)
Dept: INTERNAL MEDICINE | Facility: CLINIC | Age: 53
End: 2023-10-23
Payer: COMMERCIAL

## 2023-10-23 VITALS
SYSTOLIC BLOOD PRESSURE: 161 MMHG | OXYGEN SATURATION: 99 % | DIASTOLIC BLOOD PRESSURE: 74 MMHG | RESPIRATION RATE: 20 BRPM | BODY MASS INDEX: 32.57 KG/M2 | WEIGHT: 227.5 LBS | HEIGHT: 70 IN | TEMPERATURE: 99.1 F | HEART RATE: 89 BPM

## 2023-10-23 DIAGNOSIS — J40 BRONCHITIS: ICD-10-CM

## 2023-10-23 DIAGNOSIS — H66.002 NON-RECURRENT ACUTE SUPPURATIVE OTITIS MEDIA OF LEFT EAR WITHOUT SPONTANEOUS RUPTURE OF TYMPANIC MEMBRANE: Primary | ICD-10-CM

## 2023-10-23 DIAGNOSIS — H69.93 DYSFUNCTION OF BOTH EUSTACHIAN TUBES: ICD-10-CM

## 2023-10-23 DIAGNOSIS — R03.0 ELEVATED BLOOD PRESSURE READING WITHOUT DIAGNOSIS OF HYPERTENSION: ICD-10-CM

## 2023-10-23 PROCEDURE — 99213 OFFICE O/P EST LOW 20 MIN: CPT | Performed by: NURSE PRACTITIONER

## 2023-10-23 RX ORDER — BENZONATATE 200 MG/1
200 CAPSULE ORAL 3 TIMES DAILY PRN
Qty: 30 CAPSULE | Refills: 0 | Status: SHIPPED | OUTPATIENT
Start: 2023-10-23 | End: 2024-02-14

## 2023-10-23 RX ORDER — FLUTICASONE PROPIONATE 50 MCG
1 SPRAY, SUSPENSION (ML) NASAL DAILY
Qty: 16 G | Refills: 0 | Status: SHIPPED | OUTPATIENT
Start: 2023-10-23

## 2023-10-23 RX ORDER — AZITHROMYCIN 250 MG/1
TABLET, FILM COATED ORAL
Qty: 6 TABLET | Refills: 0 | Status: SHIPPED | OUTPATIENT
Start: 2023-10-23 | End: 2023-10-28

## 2023-10-23 ASSESSMENT — PAIN SCALES - GENERAL: PAINLEVEL: MILD PAIN (2)

## 2023-10-23 ASSESSMENT — ENCOUNTER SYMPTOMS: COUGH: 1

## 2023-10-23 NOTE — PROGRESS NOTES
"  Assessment & Plan   Problem List Items Addressed This Visit       Elevated blood pressure reading without diagnosis of hypertension     Several elevated readings with prior office visits but he is ill today. He has a cuff at home and his spouse is an RN. Encouraged checking readings after illness subsides and if consistently >140/90 on his home cuff, he should be re-evaluated in the office and consider treatment for hypertension.           Other Visit Diagnoses       Non-recurrent acute suppurative otitis media of left ear without spontaneous rupture of tympanic membrane    -  Primary    Relevant Medications    azithromycin (ZITHROMAX) 250 MG tablet    Bronchitis        Relevant Medications    benzonatate (TESSALON) 200 MG capsule    Dysfunction of both eustachian tubes        Relevant Medications    fluticasone (FLONASE) 50 MCG/ACT nasal spray           - Start azithromycin  - Fluticasone nasal spray for eustachian tube dysfunction  - Reviewed bronchitis treatment including cough suppressant, increased fluids  - Ibuprofen and/or acetaminophen for headache  - Follow up if symptoms worsen or fail to improve in the next 7-10 days          BMI:   Estimated body mass index is 33.11 kg/m  as calculated from the following:    Height as of this encounter: 1.765 m (5' 9.5\").    Weight as of this encounter: 103.2 kg (227 lb 8 oz).         Yulissa Jeffrey NP  [facility] KEISHA Hanks is a 52 year old, presenting for the following health issues:  Cough (Lasting 3 weeks; pt states that his abdomen hurts when he coughs, his throat hurts as well. ), Migraine, and Rash        10/23/2023    11:44 AM   Additional Questions   Roomed by Michael APONTE   Accompanied by N/A       History of Present Illness       Headaches:   Since the patient's last clinic visit, headaches are: no change  The patient is getting headaches:  Occationally  He is able to do normal daily activities when he has a migraine.  The " "patient is taking the following rescue/relief medications:  No rescue/relief medications   Patient states \"I get no relief\" from the rescue/relief medications.   The patient is taking the following medications to prevent migraines:  No medications to prevent migraines  In the past 4 weeks, the patient has gone to an Urgent Care or Emergency Room 0 times times due to headaches.    He eats 2-3 servings of fruits and vegetables daily.He consumes 0 sweetened beverage(s) daily.He exercises with enough effort to increase his heart rate 20 to 29 minutes per day.  He exercises with enough effort to increase his heart rate 5 days per week. He is missing 5 dose(s) of medications per week.     He has had a cough for the past 3 weeks. He has a sore throat. When symptoms first started he had cold symptoms which started after his 3-year-old was ill. The cough is occasionally productive. He has been taking Dayquil/Nyquil- seems to help. He has also been taking ibuprofen. He has headaches intermittently.     Blood pressure- He does not generally check his blood pressure at home.       Review of Systems   Respiratory:  Positive for cough.             Objective    BP (!) 161/74 (BP Location: Right arm, Patient Position: Sitting, Cuff Size: Adult Large)   Pulse 89   Temp 99.1  F (37.3  C) (Oral)   Resp 20   Ht 1.765 m (5' 9.5\")   Wt 103.2 kg (227 lb 8 oz)   SpO2 99%   BMI 33.11 kg/m    Body mass index is 33.11 kg/m .    Physical Exam   GENERAL: Alert and no distress  EYES: Eyes grossly normal to inspection  HENT: ear canals normal. Marked erythema between 11-12:00 left TM, right TM bulging with clear fluid. Mouth without ulcers or lesions, pharynx is mildly erythematous but no exudate.   RESP: lungs clear to auscultation - no rales, rhonchi or wheezes  CV: regular rate and rhythm, normal S1 S2                        "

## 2023-10-23 NOTE — PATIENT INSTRUCTIONS
- Call the clinic for an appointment if your blood pressure readings at home are consistently above 140/90.     - Use an over-the-counter flonase nasal spray to help with your ears

## 2023-10-23 NOTE — ASSESSMENT & PLAN NOTE
Several elevated readings with prior office visits but he is ill today. He has a cuff at home and his spouse is an RN. Encouraged checking readings after illness subsides and if consistently >140/90 on his home cuff, he should be re-evaluated in the office and consider treatment for hypertension.

## 2023-11-03 ENCOUNTER — TRANSFERRED RECORDS (OUTPATIENT)
Dept: HEALTH INFORMATION MANAGEMENT | Facility: CLINIC | Age: 53
End: 2023-11-03
Payer: COMMERCIAL

## 2023-11-10 NOTE — PROGRESS NOTES
DISCHARGE  Reason for Discharge: Held pt's chart open for 30 days as discussed. He did not make any follow-up visits. Discharging from therapy.     Equipment Issued: none    Discharge Plan: Patient to continue home program.    Referring Provider:  Nael Beach       10/10/23 0500   Appointment Info   Signing clinician's name / credentials Negar Galina, PT   Visits Used 9   Medical Diagnosis Chronic pain of right knee   PT Tx Diagnosis Chronic right knee pain with mobility and strength deficits   Quick Adds Certification   Progress Note/Certification   Start of Care Date 08/08/23   Onset of illness/injury or Date of Surgery 09/08/22   Therapy Frequency 1x/week   Predicted Duration 60 days   Certification date from 10/07/23  (First certification started on 8/8/2023 and went to 10/7/2023.)   Certification date to 12/06/23   Progress Note Due Date 09/07/23   Progress Note Completed Date 08/08/23   GOALS   PT Goals 2;3;4   PT Goal 1   Goal Identifier HEP   Goal Description In 30 days, pt will demonstrate understanding of and independence with his HEP   Goal Progress MET   Target Date 09/07/23   Date Met 10/10/23   PT Goal 2   Goal Identifier Knee outcome survey   Goal Description In 60 days, pt will improve KOOS score from 14 points to at least 8 points to indicate improved LE functional mobility   Goal Progress Ongoing   Target Date 10/07/23   PT Goal 3   Goal Identifier Walking   Goal Description In 60 days, pt will be able to walk at least 1 mile with right knee pain <2/10 for improved community ambulation.   Goal Progress Hasn't walked this far yet. But does have pain with walking in general anymore for the distances he needs to walk.   Target Date 10/07/23   PT Goal 4   Goal Identifier Squatting/ bending over/ sitting on floor   Goal Description In 60 days, pt will be able to perform a squat/ bend over/ and sit on the floor with right knee pain <2/10 in order to play with his kids.   Goal Progress MET,  able to do this without pain   Target Date 10/07/23   Date Met 10/10/23   Subjective Report   Subjective Report Demario notes his knee was pretty sore for a few days after our last session. It's feeling better now, still persisting pain on anterior aspect of knee.   Treatment Interventions (PT)   Interventions Therapeutic Procedure/Exercise;Neuromuscular Re-education   Therapeutic Procedure/Exercise   Therapeutic Procedures: strength, endurance, ROM, flexibillity minutes (25289) 33   Therapeutic Procedures Ther Proc 2;Ther Proc 3;Ther Proc 4;Ther Proc 5;Ther Proc 6;Ther Proc 7   Ther Proc 1 Bike: x5 min, WL 6   Ther Proc 1 - Details Treadmill walking: not today   Ther Proc 2 Single leg bridge: x20 maeve; Bridge + hamstring curl on physioball 2 x10   Ther Proc 2 - Details Sidelying hip abduction: R x20 with 2.5 lb ankle weight   Ther Proc 3 BW squats: x20, TRX split stance squat: x8; TRX single leg mini squat R x6 (incr pressure on knee)   Ther Proc 3 - Details Not today- Heel slides (for ROM): Rx10, verbal review for HEP   Ther Proc 4 Seated LAQ: R 1 x20, 2-3 sec holds with 2.5 lb ankle weight   Ther Proc 4 - Details Not today- Seated H/S curl: red Rx10 - HEP   Ther Proc 5 SLR: not today   Ther Proc 5 - Details NT- Resisted sidestepping: 6 x20 ft, blue theraband above knees   Ther Proc 6 Hamstring stool scoots: not today   Ther Proc 6 - Details Standing quad stretch: 2 x30 sec maeve (using stretch strap to assist)   Ther Proc 7 Standing hamstring stretch: 2 x30 sec maeve   Skilled Intervention Quad and hamstring stregthening exercises for improved knee strength, stretches provided to improve LE flexibility   Patient Response/Progress Decreased number of sets performed today due to pt reports of incr soreness for several days after our last session   Neuromuscular Re-education   Neuromuscular re-ed of mvmt, balance, coord, kinesthetic sense, posture, proprioception minutes (90524) 10   Neuro Re-ed 1 kinesiotape to R knee  "  Neuro Re-ed 1 - Details 3\" tape x3.5 blocks in a deep Y - 2 strips: places from inf to patella round knee to superior patella; 2nd strip reverse of that (superior to inferior) - placing with 50% stretch moving knee from ext to flex in supine   Skilled Intervention Exercises to improve stability of right knee   Neuro Re-ed 2 SLS: R x30 sec firm surface; R x60 sec on foam pad   Neuro Re-ed 2 - Details SLS + forward hip hinge (airplane pose): R x10, heavy cueing for form   Neuromuscular Re-education Neuro Re-ed 2;Neuro Re-ed 3   Education   Learner/Method Patient   Plan   Home program See PTRx   Plan for next session Progress quad and hip strength, any manual therapy as needed, continue with KT as needed   Comments   Comments Assessment: Patient is a 52 year old male with right knee pain since 2018 after falling into a hole and hitting his right knee. Overall he has demonstrated improvements from SOC, indicated by his ability to perform deep squats without R knee pain. He is also able to sit on the floor and play with his children with no knee pain. He does have persisting anterior medial right knee pain, but the intensity and frequency has significantly decreased since SOC. Pt would like to try his HEP on his own for awhile. Discussed keeping his chart open for 30 days if he feels he would like to come back. Otherwise if he wants to return later on down the road he can reach out to his MD for another PT referral. Pt agrees with POC.   Total Session Time   Timed Code Treatment Minutes 43   Total Treatment Time (sum of timed and untimed services) 43       "

## 2023-12-06 ENCOUNTER — TRANSFERRED RECORDS (OUTPATIENT)
Dept: HEALTH INFORMATION MANAGEMENT | Facility: CLINIC | Age: 53
End: 2023-12-06
Payer: COMMERCIAL

## 2023-12-26 ENCOUNTER — IMMUNIZATION (OUTPATIENT)
Dept: NURSING | Facility: CLINIC | Age: 53
End: 2023-12-26
Payer: COMMERCIAL

## 2023-12-26 PROCEDURE — 90682 RIV4 VACC RECOMBINANT DNA IM: CPT

## 2023-12-26 PROCEDURE — 91320 SARSCV2 VAC 30MCG TRS-SUC IM: CPT

## 2023-12-26 PROCEDURE — 90480 ADMN SARSCOV2 VAC 1/ONLY CMP: CPT

## 2023-12-26 PROCEDURE — 90471 IMMUNIZATION ADMIN: CPT

## 2024-01-17 ENCOUNTER — TRANSFERRED RECORDS (OUTPATIENT)
Dept: HEALTH INFORMATION MANAGEMENT | Facility: CLINIC | Age: 54
End: 2024-01-17
Payer: COMMERCIAL

## 2024-02-12 ENCOUNTER — TELEPHONE (OUTPATIENT)
Dept: INTERNAL MEDICINE | Facility: CLINIC | Age: 54
End: 2024-02-12
Payer: COMMERCIAL

## 2024-02-13 ENCOUNTER — TRANSFERRED RECORDS (OUTPATIENT)
Dept: HEALTH INFORMATION MANAGEMENT | Facility: CLINIC | Age: 54
End: 2024-02-13
Payer: COMMERCIAL

## 2024-02-13 NOTE — TELEPHONE ENCOUNTER
Yes, he brought this up at his visit and I told him it would be okay to establish care. Okay to keep 2/14 appointment

## 2024-02-13 NOTE — TELEPHONE ENCOUNTER
Patient has appointment scheduled for 02/14 with Yulissa Jeffrey.    Writer contacted patient to ask what the appointment is regarding. Patient states that he saw Yulissa awhile back for this cough that he has been having and it is not any better.    Writer relayed to him that he should be seeing his PCP for this instead and not Yulissa.    Patient states that Yulissa Jeffrey consented to patient that Yulissa will be his primary going forward. He says that when he met with Yulissa Jeffrey the last time he explained why he does not want to see Dr. JACKMAN anymore.    Writer was not able to find any documentation that patient can est care with Yulissa.    Patient says that if Yulissa no long wants to see patient then that is fine he will cancel his appointment.    Writer let him know that that is not the issue, Yulissa will see him but writer will talk to Yulissa for some clarification regarding this.    Writer let him know that we will keep his appointment Wednesday and will see him then.

## 2024-02-14 ENCOUNTER — HOSPITAL ENCOUNTER (OUTPATIENT)
Dept: GENERAL RADIOLOGY | Facility: HOSPITAL | Age: 54
Discharge: HOME | End: 2024-02-14
Attending: NURSE PRACTITIONER | Admitting: NURSE PRACTITIONER
Payer: COMMERCIAL

## 2024-02-14 ENCOUNTER — OFFICE VISIT (OUTPATIENT)
Dept: INTERNAL MEDICINE | Facility: CLINIC | Age: 54
End: 2024-02-14
Payer: COMMERCIAL

## 2024-02-14 VITALS
TEMPERATURE: 98.2 F | DIASTOLIC BLOOD PRESSURE: 62 MMHG | WEIGHT: 233.6 LBS | HEART RATE: 82 BPM | RESPIRATION RATE: 16 BRPM | OXYGEN SATURATION: 99 % | BODY MASS INDEX: 31.64 KG/M2 | HEIGHT: 72 IN | SYSTOLIC BLOOD PRESSURE: 130 MMHG

## 2024-02-14 DIAGNOSIS — R05.3 CHRONIC COUGH: Primary | ICD-10-CM

## 2024-02-14 DIAGNOSIS — R05.3 CHRONIC COUGH: ICD-10-CM

## 2024-02-14 PROCEDURE — 99214 OFFICE O/P EST MOD 30 MIN: CPT | Performed by: NURSE PRACTITIONER

## 2024-02-14 PROCEDURE — 71046 X-RAY EXAM CHEST 2 VIEWS: CPT

## 2024-02-14 RX ORDER — CETIRIZINE HYDROCHLORIDE 10 MG/1
10 TABLET ORAL DAILY
Qty: 30 TABLET | Refills: 0 | Status: SHIPPED | OUTPATIENT
Start: 2024-02-14 | End: 2024-03-12

## 2024-02-14 NOTE — PROGRESS NOTES
Assessment & Plan   Problem List Items Addressed This Visit    None  Visit Diagnoses       Chronic cough    -  Primary    Relevant Medications    cetirizine (ZYRTEC) 10 MG tablet    Other Relevant Orders    XR Chest 2 Views (Completed)           - Chest xray completed today due to expiratory wheezing heard in LLL and due to the chronicity of the cough (4 months). Images reviewed, I do not appreciate any acute changes/infiltrates  - START: cetirizine 10 mg daily due to observation of some improvement associated with a shorter acting antihistamine  - Follow up in 4 weeks. If symptoms persist, consider chest CT and PFTs. Could also consider a short term PPI        BMI  Estimated body mass index is 31.68 kg/m  as calculated from the following:    Height as of this encounter: 1.829 m (6').    Weight as of this encounter: 106 kg (233 lb 9.6 oz).       Adolfo Hanks is a 53 year old, presenting for the following health issues:  Follow Up (Follow up on cough)        2/14/2024    10:26 AM   Additional Questions   Roomed by Wandy   Accompanied by wife and son     History of Present Illness       Reason for visit:  Bronchitis and persistent dry coughing    He eats 2-3 servings of fruits and vegetables daily.He consumes 0 sweetened beverage(s) daily.He exercises with enough effort to increase his heart rate 20 to 29 minutes per day.  He exercises with enough effort to increase his heart rate 6 days per week. He is missing 7 dose(s) of medications per week.     He has been having a dry cough. He wonders about allergies or asthma. He was seen in October for a cough, that cough had improved since then but has lingered. The cough seems worse in the morning and evening. He has some nasal drainage. No itchy/watery eyes. He coughs more if he eats peanuts it will worsen. He tried an antihistamine, chlorphenteramine, the cough improves when he takes it but does not resolve. Cough worsens with cold air exposure. He does not  usually experience seasonal allergies. A comforter he had seemed to aggravate symptoms. No personal or known family history of asthma but he knows his mother coughed a lot.     No heart burn, dysphagia.         Objective    /62   Pulse 82   Temp 98.2  F (36.8  C) (Oral)   Resp 16   Ht 1.829 m (6')   Wt 106 kg (233 lb 9.6 oz)   SpO2 99%   BMI 31.68 kg/m    Body mass index is 31.68 kg/m .    Physical Exam   GENERAL: alert and no distress  EYES: Eyes grossly normal to inspection, conjunctivae and sclerae normal  HENT: ear canals and TM's normal, nose and mouth without ulcers or lesions  RESP: lungs clear to auscultation - no rales, rhonchi. Occasional LLL expiratory wheeze   CV: regular rate and rhythm, normal S1 S2, no S3 or S4, no murmur, click or rub, no peripheral edema              Signed Electronically by: Yulissa Jeffrey, RADHA

## 2024-03-12 ENCOUNTER — OFFICE VISIT (OUTPATIENT)
Dept: INTERNAL MEDICINE | Facility: CLINIC | Age: 54
End: 2024-03-12
Payer: COMMERCIAL

## 2024-03-12 VITALS
RESPIRATION RATE: 20 BRPM | SYSTOLIC BLOOD PRESSURE: 138 MMHG | BODY MASS INDEX: 32.55 KG/M2 | DIASTOLIC BLOOD PRESSURE: 70 MMHG | WEIGHT: 240.3 LBS | HEIGHT: 72 IN | OXYGEN SATURATION: 99 % | TEMPERATURE: 98 F | HEART RATE: 81 BPM

## 2024-03-12 DIAGNOSIS — R06.81 WITNESSED APNEIC SPELLS: ICD-10-CM

## 2024-03-12 DIAGNOSIS — R05.3 CHRONIC COUGH: ICD-10-CM

## 2024-03-12 DIAGNOSIS — R06.2 WHEEZING: Primary | ICD-10-CM

## 2024-03-12 PROCEDURE — 99214 OFFICE O/P EST MOD 30 MIN: CPT | Performed by: NURSE PRACTITIONER

## 2024-03-12 RX ORDER — CETIRIZINE HYDROCHLORIDE 10 MG/1
10 TABLET ORAL DAILY
Qty: 90 TABLET | Refills: 3 | Status: SHIPPED | OUTPATIENT
Start: 2024-03-12

## 2024-03-12 ASSESSMENT — PAIN SCALES - GENERAL: PAINLEVEL: SEVERE PAIN (7)

## 2024-03-12 NOTE — PROGRESS NOTES
Assessment & Plan   Problem List Items Addressed This Visit    None  Visit Diagnoses       Wheezing    -  Primary    Relevant Medications    cetirizine (ZYRTEC) 10 MG tablet    Other Relevant Orders    Adult Allergy/Asthma  Referral    General PFT Lab (Please always keep checked)    Pulmonary Function Test    Chronic cough        Relevant Medications    cetirizine (ZYRTEC) 10 MG tablet    Other Relevant Orders    Adult Allergy/Asthma  Referral    General PFT Lab (Please always keep checked)    Pulmonary Function Test    Witnessed apneic spells        Relevant Orders    Adult Sleep Eval & Management Referral           - Recurrent cough and wheezing suspicious for asthma. Further evaluation with allergist referral and PFT. Follow up after testing completed   - Continue cetirizine   - Sleep med referral for possible sleep apnea symptoms        Return in about 2 months (around 5/12/2024) for physical/follow up .    BMI  Estimated body mass index is 32.59 kg/m  as calculated from the following:    Height as of this encounter: 1.829 m (6').    Weight as of this encounter: 109 kg (240 lb 4.8 oz).       Adolfo Hanks is a 53 year old, presenting for the following health issues:  Follow Up (1 month follow up) and Allergies (Pt would like to talk about testing)        3/12/2024    10:50 AM   Additional Questions   Roomed by Michael DIAZ   Accompanied by Wife and Son     History of Present Illness       Reason for visit:  Pains and cough    He eats 2-3 servings of fruits and vegetables daily.He consumes 0 sweetened beverage(s) daily.He exercises with enough effort to increase his heart rate 20 to 29 minutes per day.  He exercises with enough effort to increase his heart rate 5 days per week. He is missing 1 dose(s) of medications per week.     The cough has improved since taking cetirizine regularly but not resolved. He still has occasional chest tightness. If he misses a dose the coughing is worse.     He  has occasional constipation. He never has heartburn.           Objective    /70   Pulse 81   Temp 98  F (36.7  C) (Oral)   Resp 20   Ht 1.829 m (6')   Wt 109 kg (240 lb 4.8 oz)   SpO2 99%   BMI 32.59 kg/m    Body mass index is 32.59 kg/m .    Wt Readings from Last 5 Encounters:   03/12/24 109 kg (240 lb 4.8 oz)   02/14/24 106 kg (233 lb 9.6 oz)   10/23/23 103.2 kg (227 lb 8 oz)   08/04/23 101.2 kg (223 lb)   05/15/23 101.8 kg (224 lb 8 oz)       [facility] ASSESSMENT    1.  Do you snore loudly (louder than talking or loud enough to be heard through closed doors):  no    2.  Do you often feel tired, fatigued, or sleepy during the day:  no    3.  Has anyone observed you stop breathing during sleep:  yes    4.  Do you have or are you being treated for high blood pressure:  no    5.  Body mass index > 35:  Body mass index is 32.59 kg/m .    6.  Age > 50:  53 year old     7.  Neck circumference greater than 40 cm:  47.5cm    8.  Gender male:  male     Total score:  4    A score of 3 or greater indicates a risk for sleep apnea (84% sensitivity).  A score of 5 or greater is more predictive of clinically relevant moderate to severe obstructive sleep apnea.      Physical Exam   GENERAL: alert and no distress  RESP: lungs clear to auscultation - no rales, rhonchi or wheezes  CV: regular rate and rhythm, normal S1 S2, no S3 or S4, no murmur            Signed Electronically by: Yulissa Jeffrey NP

## 2024-03-28 ENCOUNTER — OFFICE VISIT (OUTPATIENT)
Dept: PULMONOLOGY | Facility: CLINIC | Age: 54
End: 2024-03-28
Attending: NURSE PRACTITIONER
Payer: COMMERCIAL

## 2024-03-28 DIAGNOSIS — R05.3 CHRONIC COUGH: ICD-10-CM

## 2024-03-28 DIAGNOSIS — R06.2 WHEEZING: ICD-10-CM

## 2024-03-28 LAB — HGB BLD-MCNC: 14.6 G/DL

## 2024-03-28 PROCEDURE — 94726 PLETHYSMOGRAPHY LUNG VOLUMES: CPT | Performed by: INTERNAL MEDICINE

## 2024-03-28 PROCEDURE — 94060 EVALUATION OF WHEEZING: CPT | Performed by: INTERNAL MEDICINE

## 2024-03-28 PROCEDURE — 85018 HEMOGLOBIN: CPT | Mod: QW | Performed by: INTERNAL MEDICINE

## 2024-03-28 PROCEDURE — 94729 DIFFUSING CAPACITY: CPT | Performed by: INTERNAL MEDICINE

## 2024-03-29 LAB
DLCOCOR-%PRED-PRE: 142 %
DLCOCOR-PRE: 39.26 ML/MIN/MMHG
DLCOUNC-%PRED-PRE: 142 %
DLCOUNC-PRE: 39.26 ML/MIN/MMHG
DLCOUNC-PRED: 27.61 ML/MIN/MMHG
ERV-%PRED-PRE: 48 %
ERV-PRE: 0.76 L
ERV-PRED: 1.57 L
EXPTIME-PRE: 6.03 SEC
FEF2575-%PRED-POST: 125 %
FEF2575-%PRED-PRE: 124 %
FEF2575-POST: 3.79 L/SEC
FEF2575-PRE: 3.77 L/SEC
FEF2575-PRED: 3.03 L/SEC
FEFMAX-%PRED-PRE: 113 %
FEFMAX-PRE: 10.57 L/SEC
FEFMAX-PRED: 9.32 L/SEC
FEV1-%PRED-PRE: 99 %
FEV1-PRE: 3.38 L
FEV1FEV6-PRE: 85 %
FEV1FEV6-PRED: 80 %
FEV1FVC-PRE: 85 %
FEV1FVC-PRED: 79 %
FEV1SVC-PRE: 86 %
FEV1SVC-PRED: 75 %
FIFMAX-PRE: 5.79 L/SEC
FVC-%PRED-PRE: 93 %
FVC-PRE: 3.99 L
FVC-PRED: 4.28 L
IC-%PRED-PRE: 97 %
IC-PRE: 3.06 L
IC-PRED: 3.13 L
VA-%PRED-PRE: 90 %
VA-PRE: 5.76 L
VC-%PRED-PRE: 87 %
VC-PRE: 3.95 L
VC-PRED: 4.51 L

## 2024-04-08 ENCOUNTER — OFFICE VISIT (OUTPATIENT)
Dept: ALLERGY | Facility: CLINIC | Age: 54
End: 2024-04-08
Attending: NURSE PRACTITIONER
Payer: COMMERCIAL

## 2024-04-08 VITALS — HEIGHT: 72 IN | WEIGHT: 238 LBS | HEART RATE: 79 BPM | BODY MASS INDEX: 32.23 KG/M2 | OXYGEN SATURATION: 97 %

## 2024-04-08 DIAGNOSIS — J30.1 SEASONAL ALLERGIC RHINITIS DUE TO POLLEN: Primary | ICD-10-CM

## 2024-04-08 DIAGNOSIS — R05.3 CHRONIC COUGH: ICD-10-CM

## 2024-04-08 PROCEDURE — 99243 OFF/OP CNSLTJ NEW/EST LOW 30: CPT | Mod: 25 | Performed by: ALLERGY & IMMUNOLOGY

## 2024-04-08 PROCEDURE — 95004 PERQ TESTS W/ALRGNC XTRCS: CPT | Performed by: ALLERGY & IMMUNOLOGY

## 2024-04-08 RX ORDER — AZELASTINE 1 MG/ML
2 SPRAY, METERED NASAL 2 TIMES DAILY
Qty: 30 ML | Refills: 3 | Status: SHIPPED | OUTPATIENT
Start: 2024-04-08

## 2024-04-08 NOTE — PROGRESS NOTES
Adolfo Hanks is a 53 year old, presenting for the following health issues:  Allergy Consult (Cough, breathing issues)    HPI     Chief complaint: Chronic cough    History of present illness: This is a pleasant 53-year-old gentleman that I was asked to see for evaluation of chronic cough by Yulissa Jeffrey.  Patient states the cough began in October.  He describes the cough as dry.  He does not cough in clusters or cough to the point of throwing up.  The cough worsens when he is exposed to dust or cooking oils.  He states the cough does not wake him up from sleep.  He still reports that Zyrtec helps sometimes with the cough.  He is never been allergy tested.  He has Flonase to use but uses it more as needed.  He is believes that Flonase does improve symptoms.  He denies any reflux.  Cough does not worsen with eating.  No changes in his environment.  Chest x-ray performed in February was normal.  Pulmonary function test showed normal spirometry but was unable to complete the other maneuvers.    Past medical history: History of colonic polyps    Social history: No pets at home, non-smoking environment, living at home with central air in the basement    Family history: Negative for asthma and allergies          Objective    Pulse 79   Ht 1.829 m (6')   Wt 108 kg (238 lb)   SpO2 97%   BMI 32.28 kg/m    Body mass index is 32.28 kg/m .  Physical Exam   Gen: Pleasant male not in acute distress  HEENT: Eyes no erythema of the bulbar or palpebral conjunctiva, no edema. Ears: TMs well visualized, no effusions. Nose: Right nasal passage with enlarged inferior turbinate mouth: Throat clear, no lip or tongue edema.   Respiratory: Clear to auscultation bilaterally, no adventitious breath sounds  Skin: No rashes or lesions  Psych: Alert and oriented times 3      At today s visit the patient/parent and I engaged in an informed consent discussion about allergy testing.  We discussed skin testing, blood testing,  and the  alternative of not undergoing any testing. The patient/ parent has a preference for skin testing. We then discussed the risks and benefits of skin testing.  The patient/ parent understands skin testing risks can include, but are not limited to, urticaria, angioedema, shortness of breath, and severe anaphylaxis.  The benefits include, but are not limited, to evaluation for allergens causing symptoms.  After answering the patients/parents questions they have agreed to proceed with skin testing.    30 percutaneous test were undertaken to the environmental skin test panel.  5 mm response to ragweed.  Please see scanned photograph.    Impression report and plan:  1.  Allergic rhinitis to ragweed  2.  Chronic cough    Allergy testing only positive for rightly so does not explain his cough currently.  Recommended Astelin nasal spray for possible postnasal drainage.  Recommended nasal rinses regularly.  If cough worsens in the fall start Zyrtec and Astelin nasal spray regularly.  Discontinue Flonase given that he is using it as needed.  If the cough continues recommend evaluation with ENT for possible reflux.  Follow-up as needed.    Signed Electronically by: Sherine REECE MD

## 2024-04-08 NOTE — LETTER
4/8/2024         RE: Demario Gordon  [address] [place] [address]        Dear Colleague,    Thank you for referring your patient, Demario Gordon, to the [facility] [place]. Please see a copy of my visit note below.          Subjective  Demario is a 53 year old, presenting for the following health issues:  Allergy Consult (Cough, breathing issues)    HPI     Chief complaint: Chronic cough    History of present illness: This is a pleasant 53-year-old gentleman that I was asked to see for evaluation of chronic cough by Yulissa Jeffrey.  Patient states the cough began in October.  He describes the cough as dry.  He does not cough in clusters or cough to the point of throwing up.  The cough worsens when he is exposed to dust or cooking oils.  He states the cough does not wake him up from sleep.  He still reports that Zyrtec helps sometimes with the cough.  He is never been allergy tested.  He has Flonase to use but uses it more as needed.  He is believes that Flonase does improve symptoms.  He denies any reflux.  Cough does not worsen with eating.  No changes in his environment.  Chest x-ray performed in February was normal.  Pulmonary function test showed normal spirometry but was unable to complete the other maneuvers.    Past medical history: History of colonic polyps    Social history: No pets at home, non-smoking environment, living at home with central air in the basement    Family history: Negative for asthma and allergies          Objective   Pulse 79   Ht 1.829 m (6')   Wt 108 kg (238 lb)   SpO2 97%   BMI 32.28 kg/m    Body mass index is 32.28 kg/m .  Physical Exam   Gen: Pleasant male not in acute distress  HEENT: Eyes no erythema of the bulbar or palpebral conjunctiva, no edema. Ears: TMs well visualized, no effusions. Nose: Right nasal passage with enlarged inferior turbinate mouth: Throat clear, no lip or tongue edema.   Respiratory: Clear to auscultation bilaterally, no adventitious  breath sounds  Skin: No rashes or lesions  Psych: Alert and oriented times 3      At today s visit the patient/parent and I engaged in an informed consent discussion about allergy testing.  We discussed skin testing, blood testing,  and the alternative of not undergoing any testing. The patient/ parent has a preference for skin testing. We then discussed the risks and benefits of skin testing.  The patient/ parent understands skin testing risks can include, but are not limited to, urticaria, angioedema, shortness of breath, and severe anaphylaxis.  The benefits include, but are not limited, to evaluation for allergens causing symptoms.  After answering the patients/parents questions they have agreed to proceed with skin testing.    30 percutaneous test were undertaken to the environmental skin test panel.  5 mm response to ragweed.  Please see scanned photograph.    Impression report and plan:  1.  Allergic rhinitis to ragweed  2.  Chronic cough    Allergy testing only positive for rightly so does not explain his cough currently.  Recommended Astelin nasal spray for possible postnasal drainage.  Recommended nasal rinses regularly.  If cough worsens in the fall start Zyrtec and Astelin nasal spray regularly.  Discontinue Flonase given that he is using it as needed.  If the cough continues recommend evaluation with ENT for possible reflux.  Follow-up as needed.    Signed Electronically by: Sherine REECE MD        Again, thank you for allowing me to participate in the care of your patient.        Sincerely,        Sherine REECE MD

## 2024-04-08 NOTE — PATIENT INSTRUCTIONS
Stop Zyrtec    Astelin 2 sprays each nostril twice daily     Brent Med Sinus Rinses daily     Next step, ENT

## 2024-04-08 NOTE — PROGRESS NOTES
Demario Gordon was seen in the Allergy Clinic at [facility].    Demario Gordon is a 53 year old male  being seen today for referral from Yulissa Jeffrey NP. For evaluation regarding cough.     Patient reports he has been having coughing episodes without any identified triggers. He reports while taking cetirizine 10 mg as needed his symptoms are approximately 50% controlled.  He also has been taking Flonase, as needed.    Patient reports he also experiences chest pain, occasionally watery eyes.  Denies nighttime awakenings due to cough, wheezing, shortness of breath, denies coughing that would lead to emesis.  Patient denies past medical history of asthma diagnosis.  Patient reports he has never been hospitalized related to his breathing, or used any daily medications for his breathing.  Patient is unable to identify if his coughing is coming from his upper airway versus lower airway.  He reports coughing episodes last a few minutes, dry cough.  Denies postnasal drainage.  Patient reports coughing when exposed to the scent of cooking oils, dust, bed sheets that have not been washed in greater than 1 week. Denies recurrent sinus infections, sinus pressure/pain.     Patient denies coughing related to eating, denies sour taste in his mouth, acid reflux, increase of coughing with spicy foods, denies coughing when laying down at night.  Patient denies having been told he has acid reflux.    Patient reports that he has not had any changes to his environment, denies having pets in the home.  Patient does not identify animals as being a cause to his coughing.    No past medical history on file.    Past Surgical History:   Procedure Laterality Date    RELEASE CARPAL TUNNEL Right          Current Outpatient Medications:     diclofenac (VOLTAREN) 1 % topical gel, Apply 2 g topically 3 times daily as needed for moderate pain, Disp: 150 g, Rfl: 1    ibuprofen (ADVIL/MOTRIN) 800 MG tablet, Take 1 tablet (800 mg)  by mouth every 8 hours as needed, Disp: 60 tablet, Rfl: 2    atorvastatin (LIPITOR) 40 MG tablet, Take 1 tablet (40 mg) by mouth daily (Patient not taking: Reported on 10/23/2023), Disp: 90 tablet, Rfl: 1    capsaicin (ZOSTRIX) 0.025 % external cream, Apply 2 g topically 3 times daily as needed (pain) (Patient not taking: Reported on 8/4/2023), Disp: 118 mL, Rfl: 0    cetirizine (ZYRTEC) 10 MG tablet, Take 1 tablet (10 mg) by mouth daily (Patient not taking: Reported on 4/8/2024), Disp: 90 tablet, Rfl: 3    fluticasone (FLONASE) 50 MCG/ACT nasal spray, Spray 1 spray into both nostrils daily (Patient not taking: Reported on 4/8/2024), Disp: 16 g, Rfl: 0    hydrocortisone 2.5 % cream, [HYDROCORTISONE 2.5 % CREAM] Apply topically 3 (three) times a day. (Patient not taking: Reported on 1/9/2023), Disp: 30 g, Rfl: 0    omega-3/dha/epa/fish oil (FISH OIL-OMEGA-3 FATTY ACIDS) 300-1,000 mg capsule, [OMEGA-3/DHA/EPA/FISH OIL (FISH OIL-OMEGA-3 FATTY ACIDS) 300-1,000 MG CAPSULE] Take 2 g by mouth daily. (Patient not taking: Reported on 8/4/2023), Disp: , Rfl:     simethicone (MYLICON) 80 MG chewable tablet, Take 1 tablet (80 mg) by mouth every 6 hours as needed for flatulence or cramping (Patient not taking: Reported on 3/12/2024), Disp: 30 tablet, Rfl: 1  No Known Allergies        EXAM:   Pulse 79   Ht 1.829 m (6')   Wt 108 kg (238 lb)   SpO2 97%   BMI 32.28 kg/m      Physical Exam  Constitutional:       General: He is not in acute distress.     Appearance: Normal appearance. He is not ill-appearing.   HENT:      Nose: Congestion present. No nasal deformity, septal deviation or rhinorrhea.      Right Turbinates: Not enlarged, swollen or pale.      Left Turbinates: Not enlarged, swollen or pale.      Comments: Left nasal turbinate hypertrophy      Mouth/Throat:      Mouth: Mucous membranes are moist.      Pharynx: Oropharynx is clear. Uvula midline. No posterior oropharyngeal erythema.      Tonsils: 0 on the right. 0 on  the left.   Eyes:      General: No allergic shiner.        Right eye: No discharge.         Left eye: No discharge.      Conjunctiva/sclera: Conjunctivae normal.   Cardiovascular:      Rate and Rhythm: Normal rate and regular rhythm.      Heart sounds: Normal heart sounds, S1 normal and S2 normal.   Pulmonary:      Effort: Pulmonary effort is normal. No prolonged expiration.      Breath sounds: Normal breath sounds. No decreased air movement. No wheezing.   Neurological:      Mental Status: He is alert.   Psychiatric:         Mood and Affect: Mood normal.         Behavior: Behavior normal.         Judgment: Judgment normal.         WORKUP: Skin testing  Ragweed, positive.  The rest of the testing was negative today.  Please see media results for complete testing.  After discussing the risks and benefits of skin testing patient verbalized understanding and provided verbal consent to proceed.      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    Astepro nasal spray can be used, 1 to 2 sprays daily, up to 2 times daily.  Discontinue Flonase nasal spray.  Continue to use Zyrtec, 10 mg tablets as needed.  You can use this during the ragweed season if your symptoms are increased.    If symptoms are not well-controlled with Astepro nasal spray, will consider further evaluation by ENT.  Patient denies symptoms of gastric reflux, however consideration for silent reflux.  Daily sinus rinses.    Patient has not been having nighttime awakenings due to his coughing, PFT was within the normal limits prior to today's exam.  Would not for pursue further evaluation for asthma.    Follow-up as needed.  Patient was instructed to call clinic if they would like further ENT referral.    Thank you for allowing me to participate in the care of Demario Gordon.    Coreen MARINELLI have seen this patient during my training period to the Allergy, Asthma and Immunology  team under the direct  supervision of Dr. Guerrero.     Coreen Adan PA-C  [facility]

## 2024-04-15 ENCOUNTER — PATIENT OUTREACH (OUTPATIENT)
Dept: CARE COORDINATION | Facility: CLINIC | Age: 54
End: 2024-04-15
Payer: COMMERCIAL

## 2024-04-29 ENCOUNTER — PATIENT OUTREACH (OUTPATIENT)
Dept: CARE COORDINATION | Facility: CLINIC | Age: 54
End: 2024-04-29
Payer: COMMERCIAL

## 2024-06-05 DIAGNOSIS — E78.5 HYPERLIPIDEMIA LDL GOAL <100: ICD-10-CM

## 2024-06-05 DIAGNOSIS — L30.9 DERMATITIS: ICD-10-CM

## 2024-06-06 RX ORDER — ATORVASTATIN CALCIUM 40 MG/1
40 TABLET, FILM COATED ORAL DAILY
Qty: 90 TABLET | Refills: 0 | Status: SHIPPED | OUTPATIENT
Start: 2024-06-06

## 2024-06-06 RX ORDER — TRIAMCINOLONE ACETONIDE 1 MG/G
OINTMENT TOPICAL 2 TIMES DAILY
Qty: 30 G | Refills: 0 | Status: SHIPPED | OUTPATIENT
Start: 2024-06-06 | End: 2024-06-20

## 2024-06-06 NOTE — TELEPHONE ENCOUNTER
Call patient: He is due for physical in August.  I refilled his medication but just want to get him on the schedule to prevent delays in refills of this medication.  He will be due for blood work at this visit.   151

## 2024-06-11 NOTE — TELEPHONE ENCOUNTER
Spoke with pt. He will have to check his schedule and will call us back to schedule for physical. Did inform patient that he will need to be seen to not delay any med refills.

## 2024-07-03 ENCOUNTER — OFFICE VISIT (OUTPATIENT)
Dept: FAMILY MEDICINE | Facility: CLINIC | Age: 54
End: 2024-07-03
Payer: COMMERCIAL

## 2024-07-03 VITALS
SYSTOLIC BLOOD PRESSURE: 159 MMHG | DIASTOLIC BLOOD PRESSURE: 85 MMHG | OXYGEN SATURATION: 97 % | TEMPERATURE: 98.5 F | RESPIRATION RATE: 20 BRPM | HEART RATE: 68 BPM

## 2024-07-03 DIAGNOSIS — S39.012A STRAIN OF LUMBAR REGION, INITIAL ENCOUNTER: Primary | ICD-10-CM

## 2024-07-03 PROCEDURE — 99213 OFFICE O/P EST LOW 20 MIN: CPT | Performed by: FAMILY MEDICINE

## 2024-07-03 NOTE — PATIENT INSTRUCTIONS
Ice your back for 15 minutes at least three times a day.    Back exercises.    Referral was placed for physical therapy.

## 2024-07-03 NOTE — PROGRESS NOTES
"OUTPATIENT VISIT NOTE                                                   Date of Visit: 7/3/2024     Chief Complaint   Patient presents with:  Hip Pain: Bilateral waist line on/off \"something holding back\" pain x 1.5 months.             History of Present Illness   Demario Gordon is a 53 year old male c/o low back pain across the back. This started about 2 months ago.  Pain is intermittent.  No inciting factor.  More on left side.  No radiation into leg.  No numbness in legs.  No weakness.  No bowel or bladder control issues.  Cancer: no  Weight Loss: no  Immunosuppression: no  Prolonged steroid use: no  IV Drug Use: no  Fever: no  Pain unrelieved by rest: no  Significant Trauma: no  No medications tried.     3 months ago had an episode of rectal bleeding. States it was a lot of blood.  Passed out.  Called Ambulance but didn't go to the ER.  Before that he had had hemorrhoids banded.  No more bleeding.  He thinks the episode of bleeding started the pain.         MEDICATIONS   Current Outpatient Medications   Medication Sig Dispense Refill    atorvastatin (LIPITOR) 40 MG tablet Take 1 tablet (40 mg) by mouth daily 90 tablet 0    azelastine (ASTELIN) 0.1 % nasal spray Spray 2 sprays into both nostrils 2 times daily 30 mL 3    diclofenac (VOLTAREN) 1 % topical gel Apply 2 g topically 3 times daily as needed for moderate pain 150 g 1    ibuprofen (ADVIL/MOTRIN) 800 MG tablet Take 1 tablet (800 mg) by mouth every 8 hours as needed 60 tablet 2    capsaicin (ZOSTRIX) 0.025 % external cream Apply 2 g topically 3 times daily as needed (pain) (Patient not taking: Reported on 8/4/2023) 118 mL 0    cetirizine (ZYRTEC) 10 MG tablet Take 1 tablet (10 mg) by mouth daily (Patient not taking: Reported on 4/8/2024) 90 tablet 3    fluticasone (FLONASE) 50 MCG/ACT nasal spray Spray 1 spray into both nostrils daily (Patient not taking: Reported on 4/8/2024) 16 g 0    hydrocortisone 2.5 % cream [HYDROCORTISONE 2.5 % CREAM] Apply topically " 3 (three) times a day. (Patient not taking: Reported on 1/9/2023) 30 g 0    omega-3/dha/epa/fish oil (FISH OIL-OMEGA-3 FATTY ACIDS) 300-1,000 mg capsule [OMEGA-3/DHA/EPA/FISH OIL (FISH OIL-OMEGA-3 FATTY ACIDS) 300-1,000 MG CAPSULE] Take 2 g by mouth daily. (Patient not taking: Reported on 8/4/2023)      simethicone (MYLICON) 80 MG chewable tablet Take 1 tablet (80 mg) by mouth every 6 hours as needed for flatulence or cramping (Patient not taking: Reported on 3/12/2024) 30 tablet 1     No current facility-administered medications for this visit.         SOCIAL HISTORY   Social History     Tobacco Use    Smoking status: Never     Passive exposure: Never    Smokeless tobacco: Never   Substance Use Topics    Alcohol use: No           Physical Exam   Vitals:    07/03/24 1048   BP: (!) 159/85   Pulse: 68   Resp: 20   Temp: 98.5  F (36.9  C)   TempSrc: Tympanic   SpO2: 97%        GEN:  NAD  LUNGS:  Clear to auscultation without wheezing.  Normal effort.  HEART:  RRR without murmur, rub or gallop   BACK:  No pain to percussion over LS spine.  No tenderness to palpation over muscles.  NEURO:  DTR's: Patellar  L 2/4  R 2/4                                Achilles  L  2/4  R 2/4                  Motor:  Great Toe Flexion L 5/5  R 5/5                                                  Extension L 5/5  R 5/5                              Ankle Flexion L 5/5  R 5/5                                        Extension L 5/5  R 5/5                              Knee Flexion  L 5/5  R 5/5                                        Extension  L 5/5  R 5/5                              Hip Abduction  L 5/5  R 5/5                                    Adduction   L 5/5  R 5/5                              Hip Flexion L 5/5  R 5/5                                     Extension L 5/5  R 5/5                  Sensation intact to light touch throughout both LE                   Straight leg raising negative BL          Assessment and Plan     Strain of  lumbar region, initial encounter  Medications as ordered.  Ice 10-15 minutes at least three times a day.  Stretching and ROM exercises.  Discussed warning signs.  F/U if no improvement.    - Physical Therapy  Referral                 Discussed signs / symptoms that warrant urgent / emergent medical attention.   Recheck if worsening or not improving.       Chris Vale MD          Pertinent History     The following portions of the patient's history were reviewed and updated as appropriate: allergies, current medications, past family history, past medical history, past social history, past surgical history and problem list.

## 2024-07-07 ENCOUNTER — HEALTH MAINTENANCE LETTER (OUTPATIENT)
Age: 54
End: 2024-07-07

## 2024-09-10 ENCOUNTER — TRANSFERRED RECORDS (OUTPATIENT)
Dept: HEALTH INFORMATION MANAGEMENT | Facility: CLINIC | Age: 54
End: 2024-09-10
Payer: COMMERCIAL

## 2024-10-20 SDOH — HEALTH STABILITY: PHYSICAL HEALTH: ON AVERAGE, HOW MANY DAYS PER WEEK DO YOU ENGAGE IN MODERATE TO STRENUOUS EXERCISE (LIKE A BRISK WALK)?: 6 DAYS

## 2024-10-20 SDOH — HEALTH STABILITY: PHYSICAL HEALTH: ON AVERAGE, HOW MANY MINUTES DO YOU ENGAGE IN EXERCISE AT THIS LEVEL?: 30 MIN

## 2024-10-20 ASSESSMENT — SOCIAL DETERMINANTS OF HEALTH (SDOH): HOW OFTEN DO YOU GET TOGETHER WITH FRIENDS OR RELATIVES?: TWICE A WEEK

## 2024-10-24 ENCOUNTER — OFFICE VISIT (OUTPATIENT)
Dept: INTERNAL MEDICINE | Facility: CLINIC | Age: 54
End: 2024-10-24
Payer: COMMERCIAL

## 2024-10-24 VITALS
WEIGHT: 232.1 LBS | TEMPERATURE: 98 F | BODY MASS INDEX: 31.44 KG/M2 | OXYGEN SATURATION: 100 % | RESPIRATION RATE: 16 BRPM | DIASTOLIC BLOOD PRESSURE: 79 MMHG | HEIGHT: 72 IN | SYSTOLIC BLOOD PRESSURE: 153 MMHG | HEART RATE: 65 BPM

## 2024-10-24 DIAGNOSIS — R07.89 ANTERIOR CHEST WALL PAIN: ICD-10-CM

## 2024-10-24 DIAGNOSIS — Z00.00 ROUTINE GENERAL MEDICAL EXAMINATION AT A HEALTH CARE FACILITY: Primary | ICD-10-CM

## 2024-10-24 DIAGNOSIS — K62.5 RECTAL BLEEDING: ICD-10-CM

## 2024-10-24 DIAGNOSIS — S39.012A STRAIN OF LUMBAR REGION, INITIAL ENCOUNTER: ICD-10-CM

## 2024-10-24 DIAGNOSIS — K64.8 INTERNAL HEMORRHOIDS: ICD-10-CM

## 2024-10-24 DIAGNOSIS — Z01.84 IMMUNITY STATUS TESTING: ICD-10-CM

## 2024-10-24 DIAGNOSIS — L30.9 DERMATITIS: ICD-10-CM

## 2024-10-24 DIAGNOSIS — E78.2 MIXED HYPERLIPIDEMIA: ICD-10-CM

## 2024-10-24 DIAGNOSIS — E78.5 HYPERLIPIDEMIA LDL GOAL <100: ICD-10-CM

## 2024-10-24 DIAGNOSIS — I10 PRIMARY HYPERTENSION: ICD-10-CM

## 2024-10-24 DIAGNOSIS — Z12.11 SCREEN FOR COLON CANCER: ICD-10-CM

## 2024-10-24 DIAGNOSIS — E66.01 MORBID OBESITY (H): ICD-10-CM

## 2024-10-24 DIAGNOSIS — I10 ESSENTIAL HYPERTENSION: ICD-10-CM

## 2024-10-24 DIAGNOSIS — Z12.11 SPECIAL SCREENING FOR MALIGNANT NEOPLASMS, COLON: ICD-10-CM

## 2024-10-24 LAB
CHOLEST SERPL-MCNC: 203 MG/DL
ERYTHROCYTE [DISTWIDTH] IN BLOOD BY AUTOMATED COUNT: 11.5 % (ref 10–15)
FASTING STATUS PATIENT QL REPORTED: ABNORMAL
HBV SURFACE AB SERPL IA-ACNC: 56.7 M[IU]/ML
HBV SURFACE AB SERPL IA-ACNC: REACTIVE M[IU]/ML
HCT VFR BLD AUTO: 42.5 % (ref 40–53)
HDLC SERPL-MCNC: 47 MG/DL
HGB BLD-MCNC: 15 G/DL (ref 13.3–17.7)
LDLC SERPL CALC-MCNC: 140 MG/DL
MCH RBC QN AUTO: 31.7 PG (ref 26.5–33)
MCHC RBC AUTO-ENTMCNC: 35.3 G/DL (ref 31.5–36.5)
MCV RBC AUTO: 90 FL (ref 78–100)
NONHDLC SERPL-MCNC: 156 MG/DL
PLATELET # BLD AUTO: 199 10E3/UL (ref 150–450)
RBC # BLD AUTO: 4.73 10E6/UL (ref 4.4–5.9)
TRIGL SERPL-MCNC: 80 MG/DL
WBC # BLD AUTO: 6.9 10E3/UL (ref 4–11)

## 2024-10-24 PROCEDURE — 85027 COMPLETE CBC AUTOMATED: CPT | Performed by: NURSE PRACTITIONER

## 2024-10-24 PROCEDURE — 99396 PREV VISIT EST AGE 40-64: CPT | Performed by: NURSE PRACTITIONER

## 2024-10-24 PROCEDURE — 80061 LIPID PANEL: CPT | Performed by: NURSE PRACTITIONER

## 2024-10-24 PROCEDURE — 86706 HEP B SURFACE ANTIBODY: CPT | Performed by: NURSE PRACTITIONER

## 2024-10-24 PROCEDURE — 36415 COLL VENOUS BLD VENIPUNCTURE: CPT | Performed by: NURSE PRACTITIONER

## 2024-10-24 PROCEDURE — 80048 BASIC METABOLIC PNL TOTAL CA: CPT | Performed by: NURSE PRACTITIONER

## 2024-10-24 PROCEDURE — 99214 OFFICE O/P EST MOD 30 MIN: CPT | Mod: 25 | Performed by: NURSE PRACTITIONER

## 2024-10-24 RX ORDER — IBUPROFEN 800 MG/1
800 TABLET, FILM COATED ORAL EVERY 8 HOURS PRN
Qty: 60 TABLET | Refills: 2 | Status: SHIPPED | OUTPATIENT
Start: 2024-10-24

## 2024-10-24 RX ORDER — LOSARTAN POTASSIUM 25 MG/1
25 TABLET ORAL DAILY
Qty: 90 TABLET | Refills: 0 | Status: SHIPPED | OUTPATIENT
Start: 2024-10-24 | End: 2024-11-08 | Stop reason: DRUGHIGH

## 2024-10-24 RX ORDER — ATORVASTATIN CALCIUM 40 MG/1
40 TABLET, FILM COATED ORAL DAILY
Qty: 90 TABLET | Refills: 3 | Status: SHIPPED | OUTPATIENT
Start: 2024-10-24

## 2024-10-24 ASSESSMENT — PAIN SCALES - GENERAL: PAINLEVEL_OUTOF10: NO PAIN (1)

## 2024-10-24 NOTE — PATIENT INSTRUCTIONS
Patient Education   Preventive Care Advice   This is general advice given by our system to help you stay healthy. However, your care team may have specific advice just for you. Please talk to your care team about your preventive care needs.  Nutrition  Eat 5 or more servings of fruits and vegetables each day.  Try wheat bread, brown rice and whole grain pasta (instead of white bread, rice, and pasta).  Get enough calcium and vitamin D. Check the label on foods and aim for 100% of the RDA (recommended daily allowance).  Lifestyle  Exercise at least 150 minutes each week  (30 minutes a day, 5 days a week).  Do muscle strengthening activities 2 days a week. These help control your weight and prevent disease.  No smoking.  Wear sunscreen to prevent skin cancer.  Have a dental exam and cleaning every 6 months.  Yearly exams  See your health care team every year to talk about:  Any changes in your health.  Any medicines your care team has prescribed.  Preventive care, family planning, and ways to prevent chronic diseases.  Shots (vaccines)   HPV shots (up to age 26), if you've never had them before.  Hepatitis B shots (up to age 59), if you've never had them before.  COVID-19 shot: Get this shot when it's due.  Flu shot: Get a flu shot every year.  Tetanus shot: Get a tetanus shot every 10 years.  Pneumococcal, hepatitis A, and RSV shots: Ask your care team if you need these based on your risk.  Shingles shot (for age 50 and up)  General health tests  Diabetes screening:  Starting at age 35, Get screened for diabetes at least every 3 years.  If you are younger than age 35, ask your care team if you should be screened for diabetes.  Cholesterol test: At age 39, start having a cholesterol test every 5 years, or more often if advised.  Bone density scan (DEXA): At age 50, ask your care team if you should have this scan for osteoporosis (brittle bones).  Hepatitis C: Get tested at least once in your life.  STIs (sexually  transmitted infections)  Before age 24: Ask your care team if you should be screened for STIs.  After age 24: Get screened for STIs if you're at risk. You are at risk for STIs (including HIV) if:  You are sexually active with more than one person.  You don't use condoms every time.  You or a partner was diagnosed with a sexually transmitted infection.  If you are at risk for HIV, ask about PrEP medicine to prevent HIV.  Get tested for HIV at least once in your life, whether you are at risk for HIV or not.  Cancer screening tests  Cervical cancer screening: If you have a cervix, begin getting regular cervical cancer screening tests starting at age 21.  Breast cancer scan (mammogram): If you've ever had breasts, begin having regular mammograms starting at age 40. This is a scan to check for breast cancer.  Colon cancer screening: It is important to start screening for colon cancer at age 45.  Have a colonoscopy test every 10 years (or more often if you're at risk) Or, ask your provider about stool tests like a FIT test every year or Cologuard test every 3 years.  To learn more about your testing options, visit:   .  For help making a decision, visit:   https://bit.ly/zr36220.  Prostate cancer screening test: If you have a prostate, ask your care team if a prostate cancer screening test (PSA) at age 55 is right for you.  Lung cancer screening: If you are a current or former smoker ages 50 to 80, ask your care team if ongoing lung cancer screenings are right for you.  For informational purposes only. Not to replace the advice of your health care provider. Copyright   2023 [employer]. All rights reserved. Clinically reviewed by the [facility] Transitions Program. [employer] [id number] - REV 01/24.

## 2024-10-24 NOTE — PROGRESS NOTES
Preventive Care Visit  [facility]  Yulissa Jeffrey NP  Oct 24, 2024      Assessment & Plan   Problem List Items Addressed This Visit       Internal hemorrhoids    Essential hypertension     His blood pressure has been elevated over several office visits and is consistently greater than 130/80 with checks at home.  We reviewed the risks of untreated hypertension.  He struggles with the idea of taking the medication every day and we discussed this at length.  The he does eventually agree to begin treatment for hypertension while he is also working on lifestyle measures to manage blood pressure and other cardiovascular risk factors  -Begin losartan 25 mg daily  -Repeat basic metabolic panel and blood pressure check with medical assistant visit in 2 weeks         Relevant Medications    losartan (COZAAR) 25 MG tablet    Special screening for malignant neoplasms, colon     He reports a history of rectal bleeding that occurred in April 2024.  Prior history of tubular adenoma  -Check complete blood count today  -He is already scheduled for screening colonoscopy, encouraged him to keep this visit         Mixed hyperlipidemia     His 10-year ASCVD risk is 15.5%.  He is currently treated with atorvastatin 40 mg daily  -Repeat lipid profile today             Relevant Medications    atorvastatin (LIPITOR) 40 MG tablet    Morbid obesity (H)     He has a fair amount of muscle mass and is very physically active including weightlifting.  As such, I suspect that a traditional BMI goal is not accurate for him.  That said, I do think he would benefit from some weight loss with a goal BMI less than 30.  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          Other Visit Diagnoses       Routine general medical examination at a health care facility    -  Primary    Hyperlipidemia LDL goal <100        Relevant Medications    atorvastatin (LIPITOR) 40 MG tablet    Other Relevant  Orders    Lipid panel reflex to direct LDL Fasting (Completed)    Anterior chest wall pain        Relevant Medications    ibuprofen (ADVIL/MOTRIN) 800 MG tablet    Strain of lumbar region, initial encounter        Relevant Medications    ibuprofen (ADVIL/MOTRIN) 800 MG tablet    diclofenac (VOLTAREN) 1 % topical gel    Screen for colon cancer        Rectal bleeding        Relevant Orders    CBC with platelets (Completed)    Primary hypertension        Relevant Medications    losartan (COZAAR) 25 MG tablet    Other Relevant Orders    Basic metabolic panel (Completed)    Immunity status testing        Relevant Orders    Hepatitis B Surface Antibody (Completed)           -He declines a COVID and influenza vaccine today  -Consider shingles vaccine, he declines this for the current time     BMI  Estimated body mass index is 31.48 kg/m  as calculated from the following:    Height as of this encounter: 1.829 m (6').    Weight as of this encounter: 105.3 kg (232 lb 1.6 oz).       Counseling  Appropriate preventive services were addressed with this patient via screening, questionnaire, or discussion as appropriate for fall prevention, nutrition, physical activity, Tobacco-use cessation, social engagement, weight loss and cognition.  Checklist reviewing preventive services available has been given to the patient.  Reviewed patient's diet, addressing concerns and/or questions.   He is at risk for psychosocial distress and has been provided with information to reduce risk.       Return in about 6 months (around 4/24/2025) for 6-month follow-up of hypertension.      Adolfo Hanks is a 53 year old, presenting for the following:  Physical        10/24/2024     3:26 PM   Additional Questions   Roomed by RADHA Rodriguez   Accompanied by Marianela - Wife           HPI  He reports that he had a gush of rectal bleeding that occurred in April. He wants to know what this bleeding was due to.     He had lower back/hip pain and has  improved his pain.     HTN- he has been checking his blood pressure, his readings have been in the 140s.     He has been seen several times for a complaint of a cough.  He reports that the cough has not resolved    Health Care Directive  Patient does not have a Health Care Directive: Advance Directive received and scanned. Click on Code in the patient header to view.        10/20/2024   General Health   How would you rate your overall physical health? Good   Feel stress (tense, anxious, or unable to sleep) Only a little      (!) STRESS CONCERN      10/20/2024   Nutrition   Three or more servings of calcium each day? Yes   Diet: Regular (no restrictions)   How many servings of fruit and vegetables per day? (!) 2-3   How many sweetened beverages each day? 0-1            10/20/2024   Exercise   Days per week of moderate/strenous exercise 6 days   Average minutes spent exercising at this level 30 min            10/20/2024   Social Factors   Frequency of gathering with friends or relatives Twice a week   Worry food won't last until get money to buy more Yes   Food not last or not have enough money for food? Yes   Do you have housing? (Housing is defined as stable permanent housing and does not include staying ouside in a car, in a tent, in an abandoned building, in an overnight shelter, or couch-surfing.) Yes   Are you worried about losing your housing? No   Lack of transportation? No   Unable to get utilities (heat,electricity)? No      (!) FOOD SECURITY CONCERN PRESENT      10/20/2024   Fall Risk   Fallen 2 or more times in the past year? No    Trouble with walking or balance? No        Patient-reported          10/20/2024   Dental   Dentist two times every year? Yes            10/20/2024   TB Screening   Were you born outside of the [place]? Decline            10/20/2024   Substance Use   Alcohol more than 3/day or more than 7/wk Not Applicable   Do you use any other substances recreationally? No        Social History      Tobacco Use    Smoking status: Never     Passive exposure: Never    Smokeless tobacco: Never   Vaping Use    Vaping status: Never Used   Substance Use Topics    Alcohol use: No    Drug use: No             10/20/2024   STI Screening   New sexual partner(s) since last STI/HIV test? No      Last PSA:   Prostate Specific Antigen Screen   Date Value Ref Range Status   05/15/2023 0.57 0.00 - 3.50 ng/mL Final       ASCVD Risk   The 10-year ASCVD risk score (Morgan SAWANT, et al., 2019) is: 15.4%    Values used to calculate the score:      Age: 53 years      Sex: Male      Is Non- : Yes      Diabetic: No      Tobacco smoker: No      Systolic Blood Pressure: 153 mmHg      Is BP treated: Yes      HDL Cholesterol: 47 mg/dL      Total Cholesterol: 203 mg/dL          Reviewed and updated as needed this visit by Provider   Tobacco  Allergies  Meds  Problems  Med Hx  Surg Hx  Fam Hx                 Objective    Exam  BP (!) 153/79 (BP Location: Right arm, Patient Position: Sitting, Cuff Size: Adult Large)   Pulse 65   Temp 98  F (36.7  C) (Oral)   Resp 16   Ht 1.829 m (6')   Wt 105.3 kg (232 lb 1.6 oz)   SpO2 100%   BMI 31.48 kg/m     Estimated body mass index is 31.48 kg/m  as calculated from the following:    Height as of this encounter: 1.829 m (6').    Weight as of this encounter: 105.3 kg (232 lb 1.6 oz).    Wt Readings from Last 5 Encounters:   10/24/24 105.3 kg (232 lb 1.6 oz)   04/08/24 108 kg (238 lb)   03/12/24 109 kg (240 lb 4.8 oz)   02/14/24 106 kg (233 lb 9.6 oz)   10/23/23 103.2 kg (227 lb 8 oz)       Physical Exam  GENERAL: alert and no distress  EYES: Eyes grossly normal to inspection, conjunctivae and sclerae normal  HENT: ear canals and TM's normal, mouth without ulcers or lesions  RESP: lungs clear to auscultation - no rales, rhonchi or wheezes  CV: regular rate and rhythm, normal S1 S2, no S3 or S4, no murmur, click or rub, no peripheral edema  ABDOMEN: soft,  nontender, no hepatosplenomegaly, no masses and bowel sounds normal  NEURO: Normal strength and tone, mentation intact and speech normal  PSYCH: mentation appears normal, affect normal/bright        Signed Electronically by: Yulissa Jeffrey NP

## 2024-10-25 PROBLEM — E66.01 MORBID OBESITY (H): Status: ACTIVE | Noted: 2024-10-25

## 2024-10-25 PROBLEM — I10 ESSENTIAL HYPERTENSION: Status: ACTIVE | Noted: 2022-06-22

## 2024-10-25 PROBLEM — R14.0 BLOATING SYMPTOM: Status: RESOLVED | Noted: 2022-06-22 | Resolved: 2024-10-25

## 2024-10-25 PROBLEM — E78.2 MIXED HYPERLIPIDEMIA: Status: ACTIVE | Noted: 2024-10-25

## 2024-10-25 LAB
ANION GAP SERPL CALCULATED.3IONS-SCNC: 16 MMOL/L (ref 7–15)
BUN SERPL-MCNC: 11.8 MG/DL (ref 6–20)
CALCIUM SERPL-MCNC: 9.6 MG/DL (ref 8.8–10.4)
CHLORIDE SERPL-SCNC: 102 MMOL/L (ref 98–107)
CREAT SERPL-MCNC: 0.93 MG/DL (ref 0.67–1.17)
EGFRCR SERPLBLD CKD-EPI 2021: >90 ML/MIN/1.73M2
FASTING STATUS PATIENT QL REPORTED: ABNORMAL
GLUCOSE SERPL-MCNC: 93 MG/DL (ref 70–99)
HCO3 SERPL-SCNC: 23 MMOL/L (ref 22–29)
POTASSIUM SERPL-SCNC: 4 MMOL/L (ref 3.4–5.3)
SODIUM SERPL-SCNC: 141 MMOL/L (ref 135–145)

## 2024-10-25 NOTE — ASSESSMENT & PLAN NOTE
His 10-year ASCVD risk is 15.5%.  He is currently treated with atorvastatin 40 mg daily  -Repeat lipid profile today

## 2024-10-25 NOTE — ASSESSMENT & PLAN NOTE
He has a fair amount of muscle mass and is very physically active including weightlifting.  As such, I suspect that a traditional BMI goal is not accurate for him.  That said, I do think he would benefit from some weight loss with a goal BMI less than 30.  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

## 2024-10-25 NOTE — ASSESSMENT & PLAN NOTE
His blood pressure has been elevated over several office visits and is consistently greater than 130/80 with checks at home.  We reviewed the risks of untreated hypertension.  He struggles with the idea of taking the medication every day and we discussed this at length.  The he does eventually agree to begin treatment for hypertension while he is also working on lifestyle measures to manage blood pressure and other cardiovascular risk factors  -Begin losartan 25 mg daily  -Repeat basic metabolic panel and blood pressure check with medical assistant visit in 2 weeks

## 2024-10-25 NOTE — ASSESSMENT & PLAN NOTE
He reports a history of rectal bleeding that occurred in April 2024.  Prior history of tubular adenoma  -Check complete blood count today  -He is already scheduled for screening colonoscopy, encouraged him to keep this visit

## 2024-10-25 NOTE — TELEPHONE ENCOUNTER
LMTCB. Message included wanting to ask about medication refill request received from pharmacy.     Thank you

## 2024-10-29 ENCOUNTER — DOCUMENTATION ONLY (OUTPATIENT)
Dept: LAB | Facility: CLINIC | Age: 54
End: 2024-10-29
Payer: COMMERCIAL

## 2024-10-29 DIAGNOSIS — K64.4 EXTERNAL HEMORRHOIDS: Primary | ICD-10-CM

## 2024-10-29 DIAGNOSIS — I10 ESSENTIAL HYPERTENSION: ICD-10-CM

## 2024-10-29 NOTE — PROGRESS NOTES
Demario Gordon has an upcoming lab appointment:    Future Appointments   Date Time Provider Department Center   11/7/2024  2:30 PM [facility] MA/LPN [facility]   11/7/2024  2:45 PM [facility] LAB [facility]     Patient is scheduled for the following lab(s):     There is no order available. Please review and place either future orders or HMPO (Review of Health Maintenance Protocol Orders), as appropriate.    Health Maintenance Due   Topic    ANNUAL REVIEW OF HM ORDERS      Mahamed Delgado

## 2024-10-30 RX ORDER — TRIAMCINOLONE ACETONIDE 1 MG/G
OINTMENT TOPICAL 2 TIMES DAILY
Qty: 30 G | Refills: 0 | Status: SHIPPED | OUTPATIENT
Start: 2024-10-30 | End: 2024-11-13

## 2024-11-07 ENCOUNTER — TELEPHONE (OUTPATIENT)
Dept: INTERNAL MEDICINE | Facility: CLINIC | Age: 54
End: 2024-11-07

## 2024-11-07 ENCOUNTER — ALLIED HEALTH/NURSE VISIT (OUTPATIENT)
Dept: FAMILY MEDICINE | Facility: CLINIC | Age: 54
End: 2024-11-07
Payer: COMMERCIAL

## 2024-11-07 VITALS — SYSTOLIC BLOOD PRESSURE: 163 MMHG | DIASTOLIC BLOOD PRESSURE: 77 MMHG | HEART RATE: 78 BPM

## 2024-11-07 DIAGNOSIS — I10 ESSENTIAL HYPERTENSION: Primary | ICD-10-CM

## 2024-11-07 PROCEDURE — 36415 COLL VENOUS BLD VENIPUNCTURE: CPT

## 2024-11-07 PROCEDURE — 99207 PR NO CHARGE NURSE ONLY: CPT

## 2024-11-07 PROCEDURE — 80048 BASIC METABOLIC PNL TOTAL CA: CPT

## 2024-11-07 NOTE — PROGRESS NOTES
I met with Demario Gordon at the request of Yulissa Jeffrey NP to recheck his blood pressure.  Blood pressure medications on the med list were reviewed with patient.    Patient has taken all medications as per usual regimen: Yes  Patient reports tolerating them without any issues or concerns: No    Vitals:    11/07/24 1439 11/07/24 1444   BP: (!) 181/72 (!) 163/77   BP Location: Right arm Left arm   Patient Position: Sitting Sitting   Cuff Size: Adult Regular Adult Regular   Pulse: 78 78       After 5 minutes, the patient's blood pressure remained greater than or equal to 140/90.    Is the patient currently having any chest pain? No  Does the patient currently have a headache? Yes: Other symptoms include: light headedness (slightly) Not dizziness  Does the patient currently have any vision changes? No  Does the patient currently have any nausea? No  Does the patient currently have any abdominal pain? No    Offered triage in clinic, patient declined, I stressed the importance of triage as he may need to be seen in urgent care for further assessment. Patient stated he would prefer to send appointment notes to provider and advised by provider    Patient also wanted it to be mentioned he is having a lot of stress, recently worked 15 hour shift and did not get a lot of sleep prior to clinic visit today.    Routing visit to PCP and supporting clinic for review

## 2024-11-08 DIAGNOSIS — I10 ESSENTIAL HYPERTENSION: Primary | ICD-10-CM

## 2024-11-08 LAB
ANION GAP SERPL CALCULATED.3IONS-SCNC: 11 MMOL/L (ref 7–15)
BUN SERPL-MCNC: 12.8 MG/DL (ref 6–20)
CALCIUM SERPL-MCNC: 9.5 MG/DL (ref 8.8–10.4)
CHLORIDE SERPL-SCNC: 107 MMOL/L (ref 98–107)
CREAT SERPL-MCNC: 1.15 MG/DL (ref 0.67–1.17)
EGFRCR SERPLBLD CKD-EPI 2021: 75 ML/MIN/1.73M2
GLUCOSE SERPL-MCNC: 118 MG/DL (ref 70–99)
HCO3 SERPL-SCNC: 25 MMOL/L (ref 22–29)
POTASSIUM SERPL-SCNC: 4 MMOL/L (ref 3.4–5.3)
SODIUM SERPL-SCNC: 143 MMOL/L (ref 135–145)

## 2024-11-08 RX ORDER — LOSARTAN POTASSIUM 50 MG/1
50 TABLET ORAL DAILY
Qty: 90 TABLET | Refills: 0 | Status: SHIPPED | OUTPATIENT
Start: 2024-11-08

## 2024-12-02 ENCOUNTER — TRANSFERRED RECORDS (OUTPATIENT)
Dept: HEALTH INFORMATION MANAGEMENT | Facility: CLINIC | Age: 54
End: 2024-12-02
Payer: COMMERCIAL

## 2025-01-24 DIAGNOSIS — L30.9 DERMATITIS: ICD-10-CM

## 2025-01-24 DIAGNOSIS — S39.012A STRAIN OF LUMBAR REGION, INITIAL ENCOUNTER: ICD-10-CM

## 2025-01-28 NOTE — TELEPHONE ENCOUNTER
"Attempted to call patient however the phone number listed on file is \"not in service.\"     Sent [employer] message as well.   "

## 2025-02-03 RX ORDER — BENZOCAINE/MENTHOL 6 MG-10 MG
LOZENGE MUCOUS MEMBRANE
Qty: 27.3724 G | Refills: 0 | OUTPATIENT
Start: 2025-02-03

## 2025-02-03 RX ORDER — TRIAMCINOLONE ACETONIDE 1 MG/G
OINTMENT TOPICAL 2 TIMES DAILY
Qty: 30 G | Refills: 0 | Status: SHIPPED | OUTPATIENT
Start: 2025-02-03 | End: 2025-02-17

## 2025-02-26 DIAGNOSIS — I10 ESSENTIAL HYPERTENSION: ICD-10-CM

## 2025-02-27 RX ORDER — LOSARTAN POTASSIUM 50 MG/1
50 TABLET ORAL DAILY
Qty: 45 TABLET | Refills: 0 | Status: SHIPPED | OUTPATIENT
Start: 2025-02-27

## 2025-04-17 ENCOUNTER — OFFICE VISIT (OUTPATIENT)
Dept: INTERNAL MEDICINE | Facility: CLINIC | Age: 55
End: 2025-04-17
Payer: COMMERCIAL

## 2025-04-17 VITALS
HEART RATE: 75 BPM | WEIGHT: 242.9 LBS | BODY MASS INDEX: 32.9 KG/M2 | SYSTOLIC BLOOD PRESSURE: 160 MMHG | RESPIRATION RATE: 16 BRPM | OXYGEN SATURATION: 100 % | DIASTOLIC BLOOD PRESSURE: 80 MMHG | TEMPERATURE: 98.6 F | HEIGHT: 72 IN

## 2025-04-17 DIAGNOSIS — H69.93 DYSFUNCTION OF BOTH EUSTACHIAN TUBES: ICD-10-CM

## 2025-04-17 DIAGNOSIS — I10 ESSENTIAL HYPERTENSION: Primary | ICD-10-CM

## 2025-04-17 DIAGNOSIS — S39.012A STRAIN OF LUMBAR REGION, INITIAL ENCOUNTER: ICD-10-CM

## 2025-04-17 DIAGNOSIS — E66.01 MORBID OBESITY (H): ICD-10-CM

## 2025-04-17 DIAGNOSIS — E78.2 MIXED HYPERLIPIDEMIA: ICD-10-CM

## 2025-04-17 PROCEDURE — G2211 COMPLEX E/M VISIT ADD ON: HCPCS | Performed by: NURSE PRACTITIONER

## 2025-04-17 PROCEDURE — 3077F SYST BP >= 140 MM HG: CPT | Performed by: NURSE PRACTITIONER

## 2025-04-17 PROCEDURE — 90471 IMMUNIZATION ADMIN: CPT | Performed by: NURSE PRACTITIONER

## 2025-04-17 PROCEDURE — 99214 OFFICE O/P EST MOD 30 MIN: CPT | Mod: 25 | Performed by: NURSE PRACTITIONER

## 2025-04-17 PROCEDURE — 90750 HZV VACC RECOMBINANT IM: CPT | Performed by: NURSE PRACTITIONER

## 2025-04-17 PROCEDURE — 3078F DIAST BP <80 MM HG: CPT | Performed by: NURSE PRACTITIONER

## 2025-04-17 RX ORDER — LOSARTAN POTASSIUM 50 MG/1
50 TABLET ORAL DAILY
Qty: 90 TABLET | Refills: 2 | Status: SHIPPED | OUTPATIENT
Start: 2025-04-17

## 2025-04-17 RX ORDER — IBUPROFEN 200 MG
200 TABLET ORAL EVERY 4 HOURS PRN
Qty: 180 TABLET | Refills: 1 | Status: SHIPPED | OUTPATIENT
Start: 2025-04-17

## 2025-04-17 NOTE — PROGRESS NOTES
{PROVIDER CHARTING PREFERENCE:286985}    Adolfo Hanks is a 54 year old, presenting for the following health issues:  Follow Up (Hypertension F/U./Fluid in right ear. Patient would like to have it checked. /Ringing in right ear. Patient thinks Losartan is causing the ringing in ear. )        4/17/2025     9:16 AM   Additional Questions   Roomed by Wilbert PULIDO MA   Accompanied by Wife     History of Present Illness       Hypertension: He presents for follow up of hypertension.  He does check blood pressure  regularly outside of the clinic. Outside blood pressures have been over 140/90. He follows a low salt diet.     He eats 2-3 servings of fruits and vegetables daily.He consumes 0 sweetened beverage(s) daily.He exercises with enough effort to increase his heart rate 20 to 29 minutes per day.  He exercises with enough effort to increase his heart rate 6 days per week. He is missing 3 dose(s) of medications per week.  He is not taking prescribed medications regularly due to side effects.        {MA/LPN/RN Pre-Provider Visit Orders- hCG/UA/Strep (Optional):834138}  {SUPERLIST (Optional):655318}  {additonal problems for provider to add (Optional):438004}    {ROS Picklists (Optional):702305}      Objective    BP (!) 162/78   Pulse 75   Temp 98.6  F (37  C) (Oral)   Resp 16   Ht 1.829 m (6')   Wt 110.2 kg (242 lb 14.4 oz)   SpO2 100%   BMI 32.94 kg/m    Body mass index is 32.94 kg/m .  Physical Exam   {Exam List (Optional):526820}    {Diagnostic Test Results (Optional):752966}        Signed Electronically by: Yulissa Jeffrey NP  {Email feedback regarding this note to [email or web address]   :[id number]}

## 2025-04-17 NOTE — PROGRESS NOTES
Internal Medicine Office Visit  [address] [address]  Phone: [phone]  Fax: [phone]          Patient Name: Demario Gordon  Patient Age: 54 year old  YOB: 1970  MRN: [id number]    Date of Visit: 4/17/2025  Primary Provider: Yulissa Jeffrey    Subjective   Patient presents with:  Follow Up: Hypertension F/U.  Fluid in right ear. Patient would like to have it checked.   Ringing in right ear. Patient thinks Losartan is causing the ringing in ear.          4/17/2025     9:16 AM   Additional Questions   Roomed by Wilbert PULIDO MA   Accompanied by Wife     HPI:  Demario Gordon, 54-year-old male   - Inconsistent with blood pressure medication due to ringing in the head, which occurs more often when taking the medication consistently.   - Experiences a sensation of fullness and occasional numbness in the right ear, with a popping sensation when traveling.   - Reports a feeling of fullness in the head, not specifically in the ears, persisting even when not taking the medication.   - Noticed weight gain, which is a concern.   - Occasionally experiences pain in the middle of the back, which resolves with exercise.      Patient Active Problem List   Diagnosis    Internal hemorrhoids    Myalgia And Myositis    Skin Disorder    External hemorrhoids    Diverticular disease of large intestine    Essential hypertension    Special screening for malignant neoplasms, colon    Mixed hyperlipidemia    Morbid obesity (H)       Current Scheduled Meds:  Current Outpatient Medications   Medication Sig Dispense Refill    atorvastatin (LIPITOR) 40 MG tablet Take 1 tablet (40 mg) by mouth daily. 90 tablet 3    azelastine (ASTELIN) 0.1 % nasal spray Spray 2 sprays into both nostrils 2 times daily 30 mL 3    capsaicin (ZOSTRIX) 0.025 % external cream Apply 2 g topically 3 times daily as needed (pain) 118 mL 0    cetirizine (ZYRTEC) 10 MG tablet Take 1 tablet  (10 mg) by mouth daily 90 tablet 3    diclofenac (VOLTAREN) 1 % topical gel Apply 2 g topically 3 times daily as needed for moderate pain. 100 g 0    fluticasone (FLONASE) 50 MCG/ACT nasal spray Spray 1 spray into both nostrils daily 16 g 0    hydrocortisone 2.5 % cream [HYDROCORTISONE 2.5 % CREAM] Apply topically 3 (three) times a day. 30 g 0    ibuprofen (ADVIL/MOTRIN) 200 MG tablet Take 1 tablet (200 mg) by mouth every 4 hours as needed for pain. 180 tablet 1    losartan (COZAAR) 50 MG tablet Take 1 tablet (50 mg) by mouth daily. 90 tablet 2    omega-3/dha/epa/fish oil (FISH OIL-OMEGA-3 FATTY ACIDS) 300-1,000 mg capsule Take 2 g by mouth daily.      simethicone (MYLICON) 80 MG chewable tablet Take 1 tablet (80 mg) by mouth every 6 hours as needed for flatulence or cramping 30 tablet 1         Objective   Physical Examination:  Vitals:    04/17/25 0916 04/17/25 0959   BP: (!) 162/78 (!) 160/80   Pulse: 75    Resp: 16    Temp: 98.6  F (37  C)    TempSrc: Oral    SpO2: 100%    Weight: 110.2 kg (242 lb 14.4 oz)    Height: 1.829 m (6')      Wt Readings from Last 5 Encounters:   04/17/25 110.2 kg (242 lb 14.4 oz)   10/24/24 105.3 kg (232 lb 1.6 oz)   04/08/24 108 kg (238 lb)   03/12/24 109 kg (240 lb 4.8 oz)   02/14/24 106 kg (233 lb 9.6 oz)     Body mass index is 32.94 kg/m .     Constitutional: In no apparent distress  Eyes: Conjunctivae clear. Non-icteric.   Respiratory: Clear to auscultation bilaterally. Normal inspiratory and expiratory effort  Cardiovascular: Regular rate and rhythm. No murmurs, rubs, or gallops. No edema.  Psych: Alert and oriented x3.     Diagnoses managed today:  1. Essential hypertension    2. Morbid obesity (H)    3. Dysfunction of both eustachian tubes    4. Strain of lumbar region, initial encounter    5. Mixed hyperlipidemia           Assessment & Plan     Essential hypertension:  - Hypertension requires consistent medication management, encouraged better compliance with the medication.  Losartan may cause tinnitus, which is a rare side effect but can occur  - Continue taking losartan consistently. Consider switching to amlodipine if tinnitus becomes intolerable but he declines to make a switch today. Schedule a blood pressure recheck at [facility] in 2 weeks after taking the medication consistently.  - Risks and side effects: Discussed potential side effects of amlodipine, including leg swelling.    Dysfunction of both eustachian tubes:  - Possible fluid behind the eardrum contributing to fullness sensation.  - Referral to an audiologist and an ear, nose, and throat specialist for further evaluation.    Strain of lumbar region, initial encounter:  - Likely muscular in nature, possibly related to physical activity.  - Recommend core strengthening exercises and stretching.    Mixed hyperlipidemia:  - Cholesterol levels were higher in October compared to the previous fall.  - Take atorvastatin consistently. Plan to check cholesterol levels during the fall physical.    Morbid obesity (H):  - Discussed concerns about weight gain and its potential impact on health, including hypertension.        I have discontinued Demario Gordon's ibuprofen. I have also changed his losartan. Additionally, I am having him start on ibuprofen. Lastly, I am having him maintain his fish oil-omega-3 fatty acids, hydrocortisone, simethicone, capsaicin, fluticasone, cetirizine, azelastine, atorvastatin, and diclofenac.        Orders Placed This Encounter   Procedures    ZOSTER RECOMBINANT ADJUVANTED (SHINGRIX)    Adult ENT  Referral       Follow up: Return in about 6 months (around 10/17/2025) for physical . Sooner if needed.    The longitudinal plan of care for the diagnosis(es)/condition(s) as documented were addressed during this visit. Due to the added complexity in care, I will continue to support Demario in the subsequent management and with ongoing continuity of care.    Yulissa Jeffrey, DNP, APRN, CNP    Electronically signed

## 2025-05-05 ENCOUNTER — ALLIED HEALTH/NURSE VISIT (OUTPATIENT)
Dept: FAMILY MEDICINE | Facility: CLINIC | Age: 55
End: 2025-05-05
Payer: COMMERCIAL

## 2025-05-05 VITALS — DIASTOLIC BLOOD PRESSURE: 75 MMHG | HEART RATE: 88 BPM | SYSTOLIC BLOOD PRESSURE: 144 MMHG

## 2025-05-05 DIAGNOSIS — Z01.30 BLOOD PRESSURE CHECK: Primary | ICD-10-CM

## 2025-05-05 DIAGNOSIS — I10 ESSENTIAL HYPERTENSION: ICD-10-CM

## 2025-05-05 PROCEDURE — 3077F SYST BP >= 140 MM HG: CPT

## 2025-05-05 PROCEDURE — 99207 PR NO CHARGE NURSE ONLY: CPT

## 2025-05-05 PROCEDURE — 3078F DIAST BP <80 MM HG: CPT

## 2025-05-05 NOTE — PROGRESS NOTES
Patient stated that he is having significant pain in the right side of his neck that started yesterday AM. Patient is stating that it is really serious. I advised urgent care or visit with PCP but patient declined at this time.     I attempted to book an appointment with pcp tomorrow but patient declined at this time. Second BP was better than first.     I met with Demario Gordon at the request of Yulissa Jeffrey to recheck his blood pressure.  Blood pressure medications on the med list were reviewed with patient.    Patient has taken all medications as per usual regimen: Yes  Patient reports tolerating them without any issues or concerns: No    Vitals:    05/05/25 1537 05/05/25 1553   BP: (!) 169/76 (!) 144/75   BP Location: Right arm Left arm   Patient Position: Sitting Sitting   Cuff Size: Adult Regular Adult Regular   Pulse: 88        After 5 minutes, the patient's blood pressure remained greater than or equal to 140/90.    Is the patient currently having any chest pain? No  Does the patient currently have a headache? No  Does the patient currently have any vision changes? No  Does the patient currently have any nausea? No  Does the patient currently have any abdominal pain? No    The previous encounter was reviewed.  The patient was discharged and the note will be sent to the provider for final review.  Patient was instructed to reach out to provider via [employer] regarding his symptoms- patient preference.

## 2025-05-05 NOTE — PROGRESS NOTES
Call pt- his blood pressure is still high although I was told that he was in a lot of pain at the check. I would recommend that we increase losartan to 100 mg daily to achieve a BP goal of less than 130/80. If he is in agreement, let me know and I will send a new prescription.     For the neck pain, if he wishes to be seen for this, please offer an appointment

## 2025-05-06 RX ORDER — LOSARTAN POTASSIUM 100 MG/1
100 TABLET ORAL DAILY
Qty: 90 TABLET | Refills: 1 | Status: SHIPPED | OUTPATIENT
Start: 2025-05-06

## 2025-05-06 NOTE — PROGRESS NOTES
If he would like to try scheduling an appointment with [facility] he may be able to get a sooner appointment. They can be reached at [phone]. I do not need to see him for the ear issue unless it is worse since we had discussed this at his 4/17 visit.

## 2025-06-02 ENCOUNTER — TRANSFERRED RECORDS (OUTPATIENT)
Dept: HEALTH INFORMATION MANAGEMENT | Facility: CLINIC | Age: 55
End: 2025-06-02
Payer: COMMERCIAL

## 2025-06-12 ENCOUNTER — TRANSFERRED RECORDS (OUTPATIENT)
Dept: HEALTH INFORMATION MANAGEMENT | Facility: CLINIC | Age: 55
End: 2025-06-12
Payer: COMMERCIAL

## 2025-06-25 DIAGNOSIS — H69.93 DYSFUNCTION OF BOTH EUSTACHIAN TUBES: ICD-10-CM

## 2025-06-26 RX ORDER — FLUTICASONE PROPIONATE 50 MCG
1 SPRAY, SUSPENSION (ML) NASAL DAILY
Qty: 16 G | Refills: 0 | Status: SHIPPED | OUTPATIENT
Start: 2025-06-26

## 2025-06-26 NOTE — TELEPHONE ENCOUNTER
"Note from visit on 4/17 with PCP states: \"I am having him maintain his fish oil-omega-3 fatty acids, hydrocortisone, simethicone, capsaicin, fluticasone, cetirizine, azelastine, atorvastatin, and diclofenac. \"  "

## 2025-09-03 ENCOUNTER — OFFICE VISIT (OUTPATIENT)
Dept: INTERNAL MEDICINE | Facility: CLINIC | Age: 55
End: 2025-09-03
Payer: COMMERCIAL

## 2025-09-03 VITALS
DIASTOLIC BLOOD PRESSURE: 70 MMHG | WEIGHT: 239.6 LBS | OXYGEN SATURATION: 98 % | HEART RATE: 54 BPM | TEMPERATURE: 98.1 F | SYSTOLIC BLOOD PRESSURE: 136 MMHG | BODY MASS INDEX: 32.45 KG/M2 | HEIGHT: 72 IN | RESPIRATION RATE: 18 BRPM

## 2025-09-03 DIAGNOSIS — M26.609 TMJ (TEMPOROMANDIBULAR JOINT SYNDROME): ICD-10-CM

## 2025-09-03 DIAGNOSIS — R10.9 FLANK PAIN: Primary | ICD-10-CM

## 2025-09-03 DIAGNOSIS — I10 ESSENTIAL HYPERTENSION: ICD-10-CM

## 2025-09-03 DIAGNOSIS — S39.012A STRAIN OF LUMBAR REGION, INITIAL ENCOUNTER: ICD-10-CM

## 2025-09-03 LAB
ALBUMIN UR-MCNC: NEGATIVE MG/DL
APPEARANCE UR: CLEAR
BILIRUB UR QL STRIP: NEGATIVE
COLOR UR AUTO: YELLOW
GLUCOSE UR STRIP-MCNC: NEGATIVE MG/DL
HGB UR QL STRIP: NEGATIVE
KETONES UR STRIP-MCNC: NEGATIVE MG/DL
LEUKOCYTE ESTERASE UR QL STRIP: NEGATIVE
NITRATE UR QL: NEGATIVE
PH UR STRIP: 6 [PH] (ref 5–8)
SP GR UR STRIP: 1.01 (ref 1–1.03)
UROBILINOGEN UR STRIP-ACNC: 0.2 E.U./DL

## 2025-09-03 PROCEDURE — 80048 BASIC METABOLIC PNL TOTAL CA: CPT | Performed by: NURSE PRACTITIONER

## 2025-09-03 PROCEDURE — 81003 URINALYSIS AUTO W/O SCOPE: CPT | Performed by: NURSE PRACTITIONER

## 2025-09-03 PROCEDURE — 36415 COLL VENOUS BLD VENIPUNCTURE: CPT | Performed by: NURSE PRACTITIONER

## 2025-09-03 RX ORDER — IBUPROFEN 600 MG/1
600 TABLET, FILM COATED ORAL EVERY 8 HOURS PRN
Qty: 100 TABLET | Refills: 3 | Status: SHIPPED | OUTPATIENT
Start: 2025-09-03

## 2025-09-03 ASSESSMENT — PAIN SCALES - GENERAL: PAINLEVEL_OUTOF10: SEVERE PAIN (10)

## 2025-09-04 LAB
ANION GAP SERPL CALCULATED.3IONS-SCNC: 11 MMOL/L (ref 7–15)
BUN SERPL-MCNC: 9.4 MG/DL (ref 6–20)
CALCIUM SERPL-MCNC: 9.5 MG/DL (ref 8.8–10.4)
CHLORIDE SERPL-SCNC: 104 MMOL/L (ref 98–107)
CREAT SERPL-MCNC: 0.85 MG/DL (ref 0.67–1.17)
EGFRCR SERPLBLD CKD-EPI 2021: >90 ML/MIN/1.73M2
GLUCOSE SERPL-MCNC: 90 MG/DL (ref 70–99)
HCO3 SERPL-SCNC: 25 MMOL/L (ref 22–29)
POTASSIUM SERPL-SCNC: 4.1 MMOL/L (ref 3.4–5.3)
SODIUM SERPL-SCNC: 140 MMOL/L (ref 135–145)